# Patient Record
Sex: MALE | Race: OTHER | HISPANIC OR LATINO | URBAN - METROPOLITAN AREA
[De-identification: names, ages, dates, MRNs, and addresses within clinical notes are randomized per-mention and may not be internally consistent; named-entity substitution may affect disease eponyms.]

---

## 2024-02-21 ENCOUNTER — INPATIENT (INPATIENT)
Facility: HOSPITAL | Age: 24
LOS: 2 days | Discharge: HOME | DRG: 639 | End: 2024-02-24
Attending: STUDENT IN AN ORGANIZED HEALTH CARE EDUCATION/TRAINING PROGRAM | Admitting: INTERNAL MEDICINE
Payer: MEDICAID

## 2024-02-21 VITALS
TEMPERATURE: 98 F | SYSTOLIC BLOOD PRESSURE: 144 MMHG | WEIGHT: 123.02 LBS | RESPIRATION RATE: 18 BRPM | OXYGEN SATURATION: 98 % | DIASTOLIC BLOOD PRESSURE: 81 MMHG | HEART RATE: 68 BPM

## 2024-02-21 DIAGNOSIS — E11.10 TYPE 2 DIABETES MELLITUS WITH KETOACIDOSIS WITHOUT COMA: ICD-10-CM

## 2024-02-21 LAB
ALBUMIN SERPL ELPH-MCNC: 4.6 G/DL — SIGNIFICANT CHANGE UP (ref 3.3–5.2)
ALP SERPL-CCNC: 132 U/L — HIGH (ref 40–120)
ALT FLD-CCNC: 23 U/L — SIGNIFICANT CHANGE UP
ANION GAP SERPL CALC-SCNC: 25 MMOL/L — HIGH (ref 5–17)
APPEARANCE UR: CLEAR — SIGNIFICANT CHANGE UP
AST SERPL-CCNC: 16 U/L — SIGNIFICANT CHANGE UP
BACTERIA # UR AUTO: NEGATIVE /HPF — SIGNIFICANT CHANGE UP
BASE EXCESS BLDV CALC-SCNC: -7.5 MMOL/L — LOW (ref -2–3)
BASOPHILS # BLD AUTO: 0.03 K/UL — SIGNIFICANT CHANGE UP (ref 0–0.2)
BASOPHILS NFR BLD AUTO: 0.5 % — SIGNIFICANT CHANGE UP (ref 0–2)
BILIRUB SERPL-MCNC: 0.7 MG/DL — SIGNIFICANT CHANGE UP (ref 0.4–2)
BILIRUB UR-MCNC: NEGATIVE — SIGNIFICANT CHANGE UP
BUN SERPL-MCNC: 8.4 MG/DL — SIGNIFICANT CHANGE UP (ref 8–20)
CA-I SERPL-SCNC: 1.2 MMOL/L — SIGNIFICANT CHANGE UP (ref 1.15–1.33)
CALCIUM SERPL-MCNC: 8.9 MG/DL — SIGNIFICANT CHANGE UP (ref 8.4–10.5)
CAST: 1 /LPF — SIGNIFICANT CHANGE UP (ref 0–4)
CHLORIDE BLDV-SCNC: 95 MMOL/L — LOW (ref 96–108)
CHLORIDE SERPL-SCNC: 91 MMOL/L — LOW (ref 96–108)
CO2 SERPL-SCNC: 17 MMOL/L — LOW (ref 22–29)
COLOR SPEC: YELLOW — SIGNIFICANT CHANGE UP
CREAT SERPL-MCNC: 0.82 MG/DL — SIGNIFICANT CHANGE UP (ref 0.5–1.3)
DIFF PNL FLD: NEGATIVE — SIGNIFICANT CHANGE UP
EGFR: 127 ML/MIN/1.73M2 — SIGNIFICANT CHANGE UP
EOSINOPHIL # BLD AUTO: 0.06 K/UL — SIGNIFICANT CHANGE UP (ref 0–0.5)
EOSINOPHIL NFR BLD AUTO: 0.9 % — SIGNIFICANT CHANGE UP (ref 0–6)
GAS PNL BLDV: 133 MMOL/L — LOW (ref 136–145)
GAS PNL BLDV: SIGNIFICANT CHANGE UP
GAS PNL BLDV: SIGNIFICANT CHANGE UP
GLUCOSE BLDC GLUCOMTR-MCNC: 211 MG/DL — HIGH (ref 70–99)
GLUCOSE BLDV-MCNC: 403 MG/DL — HIGH (ref 70–99)
GLUCOSE SERPL-MCNC: 351 MG/DL — HIGH (ref 70–99)
GLUCOSE UR QL: >=1000 MG/DL
HCO3 BLDV-SCNC: 20 MMOL/L — LOW (ref 22–29)
HCT VFR BLD CALC: 45.2 % — SIGNIFICANT CHANGE UP (ref 39–50)
HCT VFR BLDA CALC: 49 % — SIGNIFICANT CHANGE UP
HGB BLD CALC-MCNC: 16.2 G/DL — SIGNIFICANT CHANGE UP (ref 12.6–17.4)
HGB BLD-MCNC: 15.9 G/DL — SIGNIFICANT CHANGE UP (ref 13–17)
IMM GRANULOCYTES NFR BLD AUTO: 0.2 % — SIGNIFICANT CHANGE UP (ref 0–0.9)
KETONES UR-MCNC: >=160 MG/DL
LACTATE BLDV-MCNC: 1.8 MMOL/L — SIGNIFICANT CHANGE UP (ref 0.5–2)
LEUKOCYTE ESTERASE UR-ACNC: NEGATIVE — SIGNIFICANT CHANGE UP
LYMPHOCYTES # BLD AUTO: 2.25 K/UL — SIGNIFICANT CHANGE UP (ref 1–3.3)
LYMPHOCYTES # BLD AUTO: 34.8 % — SIGNIFICANT CHANGE UP (ref 13–44)
MCHC RBC-ENTMCNC: 29 PG — SIGNIFICANT CHANGE UP (ref 27–34)
MCHC RBC-ENTMCNC: 35.2 GM/DL — SIGNIFICANT CHANGE UP (ref 32–36)
MCV RBC AUTO: 82.5 FL — SIGNIFICANT CHANGE UP (ref 80–100)
MONOCYTES # BLD AUTO: 0.49 K/UL — SIGNIFICANT CHANGE UP (ref 0–0.9)
MONOCYTES NFR BLD AUTO: 7.6 % — SIGNIFICANT CHANGE UP (ref 2–14)
NEUTROPHILS # BLD AUTO: 3.62 K/UL — SIGNIFICANT CHANGE UP (ref 1.8–7.4)
NEUTROPHILS NFR BLD AUTO: 56 % — SIGNIFICANT CHANGE UP (ref 43–77)
NITRITE UR-MCNC: NEGATIVE — SIGNIFICANT CHANGE UP
PCO2 BLDV: 45 MMHG — SIGNIFICANT CHANGE UP (ref 42–55)
PH BLDV: 7.25 — LOW (ref 7.32–7.43)
PH UR: 5.5 — SIGNIFICANT CHANGE UP (ref 5–8)
PLATELET # BLD AUTO: 337 K/UL — SIGNIFICANT CHANGE UP (ref 150–400)
PO2 BLDV: 47 MMHG — HIGH (ref 25–45)
POTASSIUM BLDV-SCNC: 4.1 MMOL/L — SIGNIFICANT CHANGE UP (ref 3.5–5.1)
POTASSIUM SERPL-MCNC: 4 MMOL/L — SIGNIFICANT CHANGE UP (ref 3.5–5.3)
POTASSIUM SERPL-SCNC: 4 MMOL/L — SIGNIFICANT CHANGE UP (ref 3.5–5.3)
PROT SERPL-MCNC: 8 G/DL — SIGNIFICANT CHANGE UP (ref 6.6–8.7)
PROT UR-MCNC: 30 MG/DL
RBC # BLD: 5.48 M/UL — SIGNIFICANT CHANGE UP (ref 4.2–5.8)
RBC # FLD: 12.1 % — SIGNIFICANT CHANGE UP (ref 10.3–14.5)
RBC CASTS # UR COMP ASSIST: 1 /HPF — SIGNIFICANT CHANGE UP (ref 0–4)
SAO2 % BLDV: 75.4 % — SIGNIFICANT CHANGE UP
SODIUM SERPL-SCNC: 133 MMOL/L — LOW (ref 135–145)
SP GR SPEC: >1.03 — HIGH (ref 1–1.03)
SQUAMOUS # UR AUTO: 1 /HPF — SIGNIFICANT CHANGE UP (ref 0–5)
UROBILINOGEN FLD QL: 1 MG/DL — SIGNIFICANT CHANGE UP (ref 0.2–1)
WBC # BLD: 6.46 K/UL — SIGNIFICANT CHANGE UP (ref 3.8–10.5)
WBC # FLD AUTO: 6.46 K/UL — SIGNIFICANT CHANGE UP (ref 3.8–10.5)
WBC UR QL: 0 /HPF — SIGNIFICANT CHANGE UP (ref 0–5)

## 2024-02-21 PROCEDURE — 74177 CT ABD & PELVIS W/CONTRAST: CPT | Mod: 26,MA

## 2024-02-21 PROCEDURE — 99285 EMERGENCY DEPT VISIT HI MDM: CPT

## 2024-02-21 RX ORDER — INSULIN HUMAN 100 [IU]/ML
6 INJECTION, SOLUTION SUBCUTANEOUS
Qty: 100 | Refills: 0 | Status: DISCONTINUED | OUTPATIENT
Start: 2024-02-21 | End: 2024-02-22

## 2024-02-21 RX ORDER — SODIUM CHLORIDE 9 MG/ML
1000 INJECTION, SOLUTION INTRAVENOUS ONCE
Refills: 0 | Status: COMPLETED | OUTPATIENT
Start: 2024-02-21 | End: 2024-02-21

## 2024-02-21 RX ORDER — CHLORHEXIDINE GLUCONATE 213 G/1000ML
1 SOLUTION TOPICAL
Refills: 0 | Status: DISCONTINUED | OUTPATIENT
Start: 2024-02-21 | End: 2024-02-24

## 2024-02-21 RX ORDER — SODIUM CHLORIDE 9 MG/ML
1000 INJECTION, SOLUTION INTRAVENOUS
Refills: 0 | Status: DISCONTINUED | OUTPATIENT
Start: 2024-02-21 | End: 2024-02-22

## 2024-02-21 RX ORDER — KETOROLAC TROMETHAMINE 30 MG/ML
15 SYRINGE (ML) INJECTION ONCE
Refills: 0 | Status: DISCONTINUED | OUTPATIENT
Start: 2024-02-21 | End: 2024-02-21

## 2024-02-21 RX ADMIN — SODIUM CHLORIDE 1000 MILLILITER(S): 9 INJECTION, SOLUTION INTRAVENOUS at 19:42

## 2024-02-21 RX ADMIN — SODIUM CHLORIDE 1000 MILLILITER(S): 9 INJECTION, SOLUTION INTRAVENOUS at 20:41

## 2024-02-21 RX ADMIN — SODIUM CHLORIDE 250 MILLILITER(S): 9 INJECTION, SOLUTION INTRAVENOUS at 23:15

## 2024-02-21 RX ADMIN — SODIUM CHLORIDE 2000 MILLILITER(S): 9 INJECTION, SOLUTION INTRAVENOUS at 22:04

## 2024-02-21 RX ADMIN — INSULIN HUMAN 6 UNIT(S)/HR: 100 INJECTION, SOLUTION SUBCUTANEOUS at 22:05

## 2024-02-21 RX ADMIN — Medication 15 MILLIGRAM(S): at 20:13

## 2024-02-21 RX ADMIN — Medication 15 MILLIGRAM(S): at 19:43

## 2024-02-21 NOTE — PATIENT PROFILE ADULT - FALL HARM RISK - UNIVERSAL INTERVENTIONS
Bed in lowest position, wheels locked, appropriate side rails in place/Call bell, personal items and telephone in reach/Instruct patient to call for assistance before getting out of bed or chair/Non-slip footwear when patient is out of bed/[place] to call system/Physically safe environment - no spills, clutter or unnecessary equipment/Purposeful Proactive Rounding/Room/bathroom lighting operational, light cord in reach

## 2024-02-21 NOTE — ED ADULT TRIAGE NOTE - SOURCE OF INFORMATION
Patient
[FreeTextEntry1] : Generally well-developed male in no acute distress.  Lungs: Clear.  Heart: Regular rate and rhythm, normal S1 and S2.  Abdomen: Positive bowel sounds, soft nontender.  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  Right below the knee distal limb with skin intact, bulbous.  Left below-knee distal limb with hyperpigmentation at the distal aspect of the tibia and a hyperpigmented callus just proximal and lateral to the distal tibia.  There is no drainage from the callus.  Range of motion of both lower extremities examined within the wheelchair are within functional limits.  Motor strength is about 5/5 throughout upper and lower extremities.  The left prosthesis is very worn, especially the outer covering.  The socket liner has an area of color change which approximately corresponds where the callus is.  Patient only had one prosthesis, so I did not attempt to have him stand.  Also he had to leave because his aide was staying past her quitting time

## 2024-02-21 NOTE — ED ADULT NURSE REASSESSMENT NOTE - NS ED NURSE REASSESS COMMENT FT1
Assumed care of pt in CC from ANTON Mann: pt found to be new onset diabetic. Education provided at bedside by MICU MD and PA. Pt and family memember display understanding. PT. A&Ox4 and amb. NSR on CM Resp. equal and unlabored b/l. VSS. NAD> Report given to floor ANTON Santacruz. [facility]'s on [facility].

## 2024-02-21 NOTE — ED ADULT NURSE NOTE - PRO INTERPRETER NEED 2
Birth Control Pills Counseling: Birth Control Pill Counseling: I discussed with the patient the potential side effects of OCPs including but not limited to increased risk of stroke, heart attack, thrombophlebitis, deep venous thrombosis, hepatic adenomas, breast changes, GI upset, headaches, and depression.  The patient verbalized understanding of the proper use and possible adverse effects of OCPs. All of the patient's questions and concerns were addressed. English Tetracycline Counseling: Patient counseled regarding possible photosensitivity and increased risk for sunburn.  Patient instructed to avoid sunlight, if possible.  When exposed to sunlight, patients should wear protective clothing, sunglasses, and sunscreen.  The patient was instructed to call the office immediately if the following severe adverse effects occur:  hearing changes, easy bruising/bleeding, severe headache, or vision changes.  The patient verbalized understanding of the proper use and possible adverse effects of tetracycline.  All of the patient's questions and concerns were addressed. Patient understands to avoid pregnancy while on therapy due to potential birth defects. Azithromycin Counseling:  I discussed with the patient the risks of azithromycin including but not limited to GI upset, allergic reaction, drug rash, diarrhea, and yeast infections. Topical Sulfur Applications Counseling: Topical Sulfur Counseling: Patient counseled that this medication may cause skin irritation or allergic reactions.  In the event of skin irritation, the patient was advised to reduce the amount of the drug applied or use it less frequently.   The patient verbalized understanding of the proper use and possible adverse effects of topical sulfur application.  All of the patient's questions and concerns were addressed. Tazorac Pregnancy And Lactation Text: This medication is not safe during pregnancy. It is unknown if this medication is excreted in breast milk. Doxycycline Counseling:  Patient counseled regarding possible photosensitivity and increased risk for sunburn.  Patient instructed to avoid sunlight, if possible.  When exposed to sunlight, patients should wear protective clothing, sunglasses, and sunscreen.  The patient was instructed to call the office immediately if the following severe adverse effects occur:  hearing changes, easy bruising/bleeding, severe headache, or vision changes.  The patient verbalized understanding of the proper use and possible adverse effects of doxycycline.  All of the patient's questions and concerns were addressed. Topical Clindamycin Counseling: Patient counseled that this medication may cause skin irritation or allergic reactions.  In the event of skin irritation, the patient was advised to reduce the amount of the drug applied or use it less frequently.   The patient verbalized understanding of the proper use and possible adverse effects of clindamycin.  All of the patient's questions and concerns were addressed. Topical Clindamycin Pregnancy And Lactation Text: This medication is Pregnancy Category B and is considered safe during pregnancy. It is unknown if it is excreted in breast milk. Erythromycin Pregnancy And Lactation Text: This medication is Pregnancy Category B and is considered safe during pregnancy. It is also excreted in breast milk. High Dose Vitamin A Pregnancy And Lactation Text: High dose vitamin A therapy is contraindicated during pregnancy and breast feeding. Doxycycline Pregnancy And Lactation Text: This medication is Pregnancy Category D and not consider safe during pregnancy. It is also excreted in breast milk but is considered safe for shorter treatment courses. Topical Retinoid Pregnancy And Lactation Text: This medication is Pregnancy Category C. It is unknown if this medication is excreted in breast milk. Topical Retinoid counseling:  Patient advised to apply a pea-sized amount only at bedtime and wait 30 minutes after washing their face before applying.  If too drying, patient may add a non-comedogenic moisturizer. The patient verbalized understanding of the proper use and possible adverse effects of retinoids.  All of the patient's questions and concerns were addressed. Erythromycin Counseling:  I discussed with the patient the risks of erythromycin including but not limited to GI upset, allergic reaction, drug rash, diarrhea, increase in liver enzymes, and yeast infections. Minocycline Counseling: Patient advised regarding possible photosensitivity and discoloration of the teeth, skin, lips, tongue and gums.  Patient instructed to avoid sunlight, if possible.  When exposed to sunlight, patients should wear protective clothing, sunglasses, and sunscreen.  The patient was instructed to call the office immediately if the following severe adverse effects occur:  hearing changes, easy bruising/bleeding, severe headache, or vision changes.  The patient verbalized understanding of the proper use and possible adverse effects of minocycline.  All of the patient's questions and concerns were addressed. Birth Control Pills Pregnancy And Lactation Text: This medication should be avoided if pregnant and for the first 30 days post-partum. Azithromycin Pregnancy And Lactation Text: This medication is considered safe during pregnancy and is also secreted in breast milk. Dapsone Counseling: I discussed with the patient the risks of dapsone including but not limited to hemolytic anemia, agranulocytosis, rashes, methemoglobinemia, kidney failure, peripheral neuropathy, headaches, GI upset, and liver toxicity.  Patients who start dapsone require monitoring including baseline LFTs and weekly CBCs for the first month, then every month thereafter.  The patient verbalized understanding of the proper use and possible adverse effects of dapsone.  All of the patient's questions and concerns were addressed. Include Pregnancy/Lactation Warning?: No Benzoyl Peroxide Pregnancy And Lactation Text: This medication is Pregnancy Category C. It is unknown if benzoyl peroxide is excreted in breast milk. Detail Level: Detailed Bactrim Pregnancy And Lactation Text: This medication is Pregnancy Category D and is known to cause fetal risk.  It is also excreted in breast milk. Isotretinoin Counseling: Patient should get monthly blood tests, not donate blood, not drive at night if vision affected, not share medication, and not undergo elective surgery for 6 months after tx completed. Side effects reviewed, pt to contact office should one occur. Benzoyl Peroxide Counseling: Patient counseled that medicine may cause skin irritation and bleach clothing.  In the event of skin irritation, the patient was advised to reduce the amount of the drug applied or use it less frequently.   The patient verbalized understanding of the proper use and possible adverse effects of benzoyl peroxide.  All of the patient's questions and concerns were addressed. Spironolactone Counseling: Patient advised regarding risks of diarrhea, abdominal pain, hyperkalemia, birth defects (for female patients), liver toxicity and renal toxicity. The patient may need blood work to monitor liver and kidney function and potassium levels while on therapy. The patient verbalized understanding of the proper use and possible adverse effects of spironolactone.  All of the patient's questions and concerns were addressed. Topical Sulfur Applications Pregnancy And Lactation Text: This medication is Pregnancy Category C and has an unknown safety profile during pregnancy. It is unknown if this topical medication is excreted in breast milk. Tazorac Counseling:  Patient advised that medication is irritating and drying.  Patient may need to apply sparingly and wash off after an hour before eventually leaving it on overnight.  The patient verbalized understanding of the proper use and possible adverse effects of tazorac.  All of the patient's questions and concerns were addressed. Bactrim Counseling:  I discussed with the patient the risks of sulfa antibiotics including but not limited to GI upset, allergic reaction, drug rash, diarrhea, dizziness, photosensitivity, and yeast infections.  Rarely, more serious reactions can occur including but not limited to aplastic anemia, agranulocytosis, methemoglobinemia, blood dyscrasias, liver or kidney failure, lung infiltrates or desquamative/blistering drug rashes. Spironolactone Pregnancy And Lactation Text: This medication can cause feminization of the male fetus and should be avoided during pregnancy. The active metabolite is also found in breast milk. Isotretinoin Pregnancy And Lactation Text: This medication is Pregnancy Category X and is considered extremely dangerous during pregnancy. It is unknown if it is excreted in breast milk. Dapsone Pregnancy And Lactation Text: This medication is Pregnancy Category C and is not considered safe during pregnancy or breast feeding. Tetracycline Pregnancy And Lactation Text: This medication is Pregnancy Category D and not consider safe during pregnancy. It is also excreted in breast milk. High Dose Vitamin A Counseling: Side effects reviewed, pt to contact office should one occur.

## 2024-02-21 NOTE — H&P ADULT - NEGATIVE ENMT SYMPTOMS
no hearing difficulty/no ear pain/no nasal congestion/no nasal discharge/no dry mouth/no throat pain

## 2024-02-21 NOTE — ED PROVIDER NOTE - PHYSICAL EXAMINATION
Const: Awake, alert and oriented. In no acute distress. Well appearing.  HEENT: NC/AT. Moist mucous membranes.  Eyes: No scleral icterus. EOMI.  Neck:. Soft and supple. Full ROM without pain.  Cardiac: Regular rate and regular rhythm. +S1/S2. Peripheral pulses 2+ and symmetric. No LE edema.  Resp: Speaking in full sentences. No evidence of respiratory distress. No wheezes, rales or rhonchi.  Abd: Soft, ttp rlq, non-distended. Normal bowel sounds in all 4 quadrants. No guarding or rebound.  Back: Spine midline and non-tender. No CVAT.  Skin: No rashes, abrasions or lacerations.  Lymph: No cervical lymphadenopathy.  Neuro: Awake, alert & oriented x 3. Moves all extremities symmetrically.

## 2024-02-21 NOTE — ED PROVIDER NOTE - CLINICAL SUMMARY MEDICAL DECISION MAKING FREE TEXT BOX
24yo male with no pmh presents with abd pain, constipation, polyuria, polydipsia and weight loss - pt hyperglycemic here concern for DKA also ttp rlq r/o appendicitis - labs, ctap 24yo male with no pmh presents with abd pain, constipation, polyuria, polydipsia and weight loss - pt hyperglycemic here concern for DKA also ttp rlq r/o appendicitis - labs, ctap    Dorie Sullivan PA : PT evaluated by intake physician. HPI/PE/ROS as noted above. Will follow up plan per intake physician  pt found to be in DKA, moved to critical and started on insulin infusion, pt and father updated w  janette. seen by micu pa admitting pt for further mgmt.

## 2024-02-21 NOTE — H&P ADULT - CRITICAL CARE ATTENDING COMMENT
Pt seen w PA and agree w above.  Pt reported not feeling well, + nausea no vomiting, polydipsia and polyuria. + weight loss. Denies FamHx of DM.  Pt found to be in DKA.  Treated w insulin drip and IVF.  Monitor FSBS Q1hr w serial metabolic panels.  Endocrine eval. Start lantus when AG closed. Monitor in ICU.

## 2024-02-21 NOTE — ED PROVIDER NOTE - OBJECTIVE STATEMENT
22yo male with no pmh presents with abd pain, constipation, polyuria, polydipsia and weight loss. Pt states for the past 3 days with constipation and past 2 weeks with the other symptoms. Pt states he's lost 10lbs in 2 weeks. Pt denies fevers/chills, ha, loc, focal neuro deficits, cp/sob/palp, cough, n/v/d, hematuria, dysuria, recent travel and sick contacts.

## 2024-02-21 NOTE — H&P ADULT - ASSESSMENT
Patient is a 22 y/o male with no pmhx. Comes into the ED complaining of abdominal pain. Patient was found to have:    #DKA w/ elevated anion gap (initial of 25)  #metabolic acidosis  #hypochloremia  #pseudohyponatremia    -Started on insulin drip at 6  -2L LR bolus given in ED  -started d5LR drip @250  -q1hr glucose monitoring  -monitor i/o's  -monitor electrolytes  -transition to sliding scale and subq insulin once bg<180 and gap closed  -no previous chart encounters found to review due to negative pmhx   -all labs and imaging reviewed. abd ct showed no acute pathology    Admit to MICU    Date of entry of this note is consistent with date of services rendered. Total time spent at bedside caring for patient: 60 minutes

## 2024-02-21 NOTE — ED ADULT NURSE NOTE - OBJECTIVE STATEMENT
Pt presents to the ED A&Ox4 c/o abdominal pain and increased urination and thirst for the last few weeks. Pt denies chest/abdominal pain. RR even and unlabored, NAD noted. Pt abdomen is soft,, NT/ND upon palpation. Pt denies any PMH. Pt presents to the ED A&Ox4 c/o abdominal pain and increased urination and thirst for the last few weeks. Pt reports difficulty having a BM for the last 3 days. Pt denies chest/abdominal pain. RR even and unlabored, NAD noted. Pt abdomen is soft,, NT/ND upon palpation. Pt denies any PMH.

## 2024-02-21 NOTE — H&P ADULT - HISTORY OF PRESENT ILLNESS
Patient is a 22 y/o male with no pmhx. Comes into the ED complaining of abdominal pain. He also mentioned recent increase in urinary frequency, feeling thirsty all the time, constipation, and new weight loss of about 10 lbs. Symptoms have been ongoing for a few weeks now. Patient denied any recent illness, denies fever, n/v/d, or recent travel. Patient also mentioned he was not aware of any family members who have diabetes type 1 or 2. While in the ED, patient's BG was found to be 360, anion gap of 25, ph of 7.25. Patient was determined to be in DKA and MICU was consulted for further management.

## 2024-02-21 NOTE — ED PROVIDER NOTE - NS ED ROS FT
+ abd pain, constipation, polyuria, polydipsia and weight loss  all others negative except as per hpi

## 2024-02-22 LAB
A1C WITH ESTIMATED AVERAGE GLUCOSE RESULT: 11.8 % — HIGH (ref 4–5.6)
ALBUMIN SERPL ELPH-MCNC: 3.3 G/DL — SIGNIFICANT CHANGE UP (ref 3.3–5.2)
ALBUMIN SERPL ELPH-MCNC: 3.5 G/DL — SIGNIFICANT CHANGE UP (ref 3.3–5.2)
ALBUMIN SERPL ELPH-MCNC: 3.6 G/DL — SIGNIFICANT CHANGE UP (ref 3.3–5.2)
ALBUMIN SERPL ELPH-MCNC: 3.6 G/DL — SIGNIFICANT CHANGE UP (ref 3.3–5.2)
ALP SERPL-CCNC: 87 U/L — SIGNIFICANT CHANGE UP (ref 40–120)
ALP SERPL-CCNC: 91 U/L — SIGNIFICANT CHANGE UP (ref 40–120)
ALP SERPL-CCNC: 96 U/L — SIGNIFICANT CHANGE UP (ref 40–120)
ALP SERPL-CCNC: 97 U/L — SIGNIFICANT CHANGE UP (ref 40–120)
ALT FLD-CCNC: 14 U/L — SIGNIFICANT CHANGE UP
ALT FLD-CCNC: 16 U/L — SIGNIFICANT CHANGE UP
ALT FLD-CCNC: 17 U/L — SIGNIFICANT CHANGE UP
ALT FLD-CCNC: 17 U/L — SIGNIFICANT CHANGE UP
ANION GAP SERPL CALC-SCNC: 15 MMOL/L — SIGNIFICANT CHANGE UP (ref 5–17)
ANION GAP SERPL CALC-SCNC: 17 MMOL/L — SIGNIFICANT CHANGE UP (ref 5–17)
ANION GAP SERPL CALC-SCNC: 20 MMOL/L — HIGH (ref 5–17)
ANION GAP SERPL CALC-SCNC: 8 MMOL/L — SIGNIFICANT CHANGE UP (ref 5–17)
AST SERPL-CCNC: 11 U/L — SIGNIFICANT CHANGE UP
AST SERPL-CCNC: 15 U/L — SIGNIFICANT CHANGE UP
AST SERPL-CCNC: 16 U/L — SIGNIFICANT CHANGE UP
AST SERPL-CCNC: 18 U/L — SIGNIFICANT CHANGE UP
BASOPHILS # BLD AUTO: 0.03 K/UL — SIGNIFICANT CHANGE UP (ref 0–0.2)
BASOPHILS NFR BLD AUTO: 0.6 % — SIGNIFICANT CHANGE UP (ref 0–2)
BILIRUB SERPL-MCNC: 0.6 MG/DL — SIGNIFICANT CHANGE UP (ref 0.4–2)
BILIRUB SERPL-MCNC: 0.7 MG/DL — SIGNIFICANT CHANGE UP (ref 0.4–2)
BUN SERPL-MCNC: 5.4 MG/DL — LOW (ref 8–20)
BUN SERPL-MCNC: 6.2 MG/DL — LOW (ref 8–20)
BUN SERPL-MCNC: 6.6 MG/DL — LOW (ref 8–20)
BUN SERPL-MCNC: 7.3 MG/DL — LOW (ref 8–20)
C PEPTIDE SERPL-MCNC: 0.6 NG/ML — LOW (ref 1.1–4.4)
CALCIUM SERPL-MCNC: 7.9 MG/DL — LOW (ref 8.4–10.5)
CALCIUM SERPL-MCNC: 8 MG/DL — LOW (ref 8.4–10.5)
CALCIUM SERPL-MCNC: 8.1 MG/DL — LOW (ref 8.4–10.5)
CALCIUM SERPL-MCNC: 8.1 MG/DL — LOW (ref 8.4–10.5)
CHLORIDE SERPL-SCNC: 104 MMOL/L — SIGNIFICANT CHANGE UP (ref 96–108)
CHLORIDE SERPL-SCNC: 95 MMOL/L — LOW (ref 96–108)
CHLORIDE SERPL-SCNC: 95 MMOL/L — LOW (ref 96–108)
CHLORIDE SERPL-SCNC: 97 MMOL/L — SIGNIFICANT CHANGE UP (ref 96–108)
CO2 SERPL-SCNC: 17 MMOL/L — LOW (ref 22–29)
CO2 SERPL-SCNC: 17 MMOL/L — LOW (ref 22–29)
CO2 SERPL-SCNC: 22 MMOL/L — SIGNIFICANT CHANGE UP (ref 22–29)
CO2 SERPL-SCNC: 26 MMOL/L — SIGNIFICANT CHANGE UP (ref 22–29)
CREAT SERPL-MCNC: 0.65 MG/DL — SIGNIFICANT CHANGE UP (ref 0.5–1.3)
CREAT SERPL-MCNC: 0.66 MG/DL — SIGNIFICANT CHANGE UP (ref 0.5–1.3)
CREAT SERPL-MCNC: 0.68 MG/DL — SIGNIFICANT CHANGE UP (ref 0.5–1.3)
CREAT SERPL-MCNC: 0.71 MG/DL — SIGNIFICANT CHANGE UP (ref 0.5–1.3)
CULTURE RESULTS: SIGNIFICANT CHANGE UP
EGFR: 132 ML/MIN/1.73M2 — SIGNIFICANT CHANGE UP
EGFR: 134 ML/MIN/1.73M2 — SIGNIFICANT CHANGE UP
EGFR: 135 ML/MIN/1.73M2 — SIGNIFICANT CHANGE UP
EGFR: 136 ML/MIN/1.73M2 — SIGNIFICANT CHANGE UP
EOSINOPHIL # BLD AUTO: 0.14 K/UL — SIGNIFICANT CHANGE UP (ref 0–0.5)
EOSINOPHIL NFR BLD AUTO: 3 % — SIGNIFICANT CHANGE UP (ref 0–6)
ESTIMATED AVERAGE GLUCOSE: 292 MG/DL — HIGH (ref 68–114)
GLUCOSE BLDC GLUCOMTR-MCNC: 103 MG/DL — HIGH (ref 70–99)
GLUCOSE BLDC GLUCOMTR-MCNC: 122 MG/DL — HIGH (ref 70–99)
GLUCOSE BLDC GLUCOMTR-MCNC: 145 MG/DL — HIGH (ref 70–99)
GLUCOSE BLDC GLUCOMTR-MCNC: 156 MG/DL — HIGH (ref 70–99)
GLUCOSE BLDC GLUCOMTR-MCNC: 160 MG/DL — HIGH (ref 70–99)
GLUCOSE BLDC GLUCOMTR-MCNC: 160 MG/DL — HIGH (ref 70–99)
GLUCOSE BLDC GLUCOMTR-MCNC: 195 MG/DL — HIGH (ref 70–99)
GLUCOSE BLDC GLUCOMTR-MCNC: 248 MG/DL — HIGH (ref 70–99)
GLUCOSE BLDC GLUCOMTR-MCNC: 257 MG/DL — HIGH (ref 70–99)
GLUCOSE BLDC GLUCOMTR-MCNC: 261 MG/DL — HIGH (ref 70–99)
GLUCOSE BLDC GLUCOMTR-MCNC: 292 MG/DL — HIGH (ref 70–99)
GLUCOSE BLDC GLUCOMTR-MCNC: 301 MG/DL — HIGH (ref 70–99)
GLUCOSE BLDC GLUCOMTR-MCNC: 83 MG/DL — SIGNIFICANT CHANGE UP (ref 70–99)
GLUCOSE SERPL-MCNC: 248 MG/DL — HIGH (ref 70–99)
GLUCOSE SERPL-MCNC: 279 MG/DL — HIGH (ref 70–99)
GLUCOSE SERPL-MCNC: 291 MG/DL — HIGH (ref 70–99)
GLUCOSE SERPL-MCNC: 76 MG/DL — SIGNIFICANT CHANGE UP (ref 70–99)
HCT VFR BLD CALC: 34 % — LOW (ref 39–50)
HGB BLD-MCNC: 12.4 G/DL — LOW (ref 13–17)
IMM GRANULOCYTES NFR BLD AUTO: 0.4 % — SIGNIFICANT CHANGE UP (ref 0–0.9)
LYMPHOCYTES # BLD AUTO: 2.26 K/UL — SIGNIFICANT CHANGE UP (ref 1–3.3)
LYMPHOCYTES # BLD AUTO: 47.7 % — HIGH (ref 13–44)
MAGNESIUM SERPL-MCNC: 1.5 MG/DL — LOW (ref 1.6–2.6)
MCHC RBC-ENTMCNC: 29.9 PG — SIGNIFICANT CHANGE UP (ref 27–34)
MCHC RBC-ENTMCNC: 36.5 GM/DL — HIGH (ref 32–36)
MCV RBC AUTO: 81.9 FL — SIGNIFICANT CHANGE UP (ref 80–100)
MONOCYTES # BLD AUTO: 0.57 K/UL — SIGNIFICANT CHANGE UP (ref 0–0.9)
MONOCYTES NFR BLD AUTO: 12 % — SIGNIFICANT CHANGE UP (ref 2–14)
MRSA PCR RESULT.: SIGNIFICANT CHANGE UP
NEUTROPHILS # BLD AUTO: 1.72 K/UL — LOW (ref 1.8–7.4)
NEUTROPHILS NFR BLD AUTO: 36.3 % — LOW (ref 43–77)
PHOSPHATE SERPL-MCNC: 3.1 MG/DL — SIGNIFICANT CHANGE UP (ref 2.4–4.7)
PLATELET # BLD AUTO: 202 K/UL — SIGNIFICANT CHANGE UP (ref 150–400)
POTASSIUM SERPL-MCNC: 2.9 MMOL/L — CRITICAL LOW (ref 3.5–5.3)
POTASSIUM SERPL-MCNC: 3.7 MMOL/L — SIGNIFICANT CHANGE UP (ref 3.5–5.3)
POTASSIUM SERPL-MCNC: 4.1 MMOL/L — SIGNIFICANT CHANGE UP (ref 3.5–5.3)
POTASSIUM SERPL-MCNC: 5 MMOL/L — SIGNIFICANT CHANGE UP (ref 3.5–5.3)
POTASSIUM SERPL-SCNC: 2.9 MMOL/L — CRITICAL LOW (ref 3.5–5.3)
POTASSIUM SERPL-SCNC: 3.7 MMOL/L — SIGNIFICANT CHANGE UP (ref 3.5–5.3)
POTASSIUM SERPL-SCNC: 4.1 MMOL/L — SIGNIFICANT CHANGE UP (ref 3.5–5.3)
POTASSIUM SERPL-SCNC: 5 MMOL/L — SIGNIFICANT CHANGE UP (ref 3.5–5.3)
PROT SERPL-MCNC: 5.3 G/DL — LOW (ref 6.6–8.7)
PROT SERPL-MCNC: 5.7 G/DL — LOW (ref 6.6–8.7)
PROT SERPL-MCNC: 5.8 G/DL — LOW (ref 6.6–8.7)
PROT SERPL-MCNC: 6 G/DL — LOW (ref 6.6–8.7)
RAPID RVP RESULT: SIGNIFICANT CHANGE UP
RBC # BLD: 4.15 M/UL — LOW (ref 4.2–5.8)
RBC # FLD: 12 % — SIGNIFICANT CHANGE UP (ref 10.3–14.5)
S AUREUS DNA NOSE QL NAA+PROBE: DETECTED
SARS-COV-2 RNA SPEC QL NAA+PROBE: SIGNIFICANT CHANGE UP
SODIUM SERPL-SCNC: 129 MMOL/L — LOW (ref 135–145)
SODIUM SERPL-SCNC: 132 MMOL/L — LOW (ref 135–145)
SODIUM SERPL-SCNC: 134 MMOL/L — LOW (ref 135–145)
SODIUM SERPL-SCNC: 138 MMOL/L — SIGNIFICANT CHANGE UP (ref 135–145)
SPECIMEN SOURCE: SIGNIFICANT CHANGE UP
WBC # BLD: 4.74 K/UL — SIGNIFICANT CHANGE UP (ref 3.8–10.5)
WBC # FLD AUTO: 4.74 K/UL — SIGNIFICANT CHANGE UP (ref 3.8–10.5)

## 2024-02-22 PROCEDURE — 99232 SBSQ HOSP IP/OBS MODERATE 35: CPT | Mod: GC

## 2024-02-22 PROCEDURE — 99223 1ST HOSP IP/OBS HIGH 75: CPT

## 2024-02-22 RX ORDER — DEXTROSE 50 % IN WATER 50 %
25 SYRINGE (ML) INTRAVENOUS ONCE
Refills: 0 | Status: DISCONTINUED | OUTPATIENT
Start: 2024-02-22 | End: 2024-02-24

## 2024-02-22 RX ORDER — ACETAMINOPHEN 500 MG
650 TABLET ORAL EVERY 6 HOURS
Refills: 0 | Status: DISCONTINUED | OUTPATIENT
Start: 2024-02-22 | End: 2024-02-24

## 2024-02-22 RX ORDER — SODIUM CHLORIDE 9 MG/ML
1000 INJECTION, SOLUTION INTRAVENOUS
Refills: 0 | Status: DISCONTINUED | OUTPATIENT
Start: 2024-02-22 | End: 2024-02-22

## 2024-02-22 RX ORDER — INSULIN HUMAN 100 [IU]/ML
4 INJECTION, SOLUTION SUBCUTANEOUS
Qty: 100 | Refills: 0 | Status: DISCONTINUED | OUTPATIENT
Start: 2024-02-22 | End: 2024-02-22

## 2024-02-22 RX ORDER — POTASSIUM CHLORIDE 20 MEQ
40 PACKET (EA) ORAL EVERY 4 HOURS
Refills: 0 | Status: COMPLETED | OUTPATIENT
Start: 2024-02-22 | End: 2024-02-22

## 2024-02-22 RX ORDER — INSULIN GLARGINE 100 [IU]/ML
6 INJECTION, SOLUTION SUBCUTANEOUS EVERY MORNING
Refills: 0 | Status: DISCONTINUED | OUTPATIENT
Start: 2024-02-22 | End: 2024-02-22

## 2024-02-22 RX ORDER — ENOXAPARIN SODIUM 100 MG/ML
40 INJECTION SUBCUTANEOUS EVERY 24 HOURS
Refills: 0 | Status: DISCONTINUED | OUTPATIENT
Start: 2024-02-22 | End: 2024-02-24

## 2024-02-22 RX ORDER — GLUCAGON INJECTION, SOLUTION 0.5 MG/.1ML
1 INJECTION, SOLUTION SUBCUTANEOUS ONCE
Refills: 0 | Status: DISCONTINUED | OUTPATIENT
Start: 2024-02-22 | End: 2024-02-22

## 2024-02-22 RX ORDER — DEXTROSE 50 % IN WATER 50 %
15 SYRINGE (ML) INTRAVENOUS ONCE
Refills: 0 | Status: DISCONTINUED | OUTPATIENT
Start: 2024-02-22 | End: 2024-02-22

## 2024-02-22 RX ORDER — SODIUM CHLORIDE 9 MG/ML
1000 INJECTION, SOLUTION INTRAVENOUS
Refills: 0 | Status: DISCONTINUED | OUTPATIENT
Start: 2024-02-22 | End: 2024-02-23

## 2024-02-22 RX ORDER — POTASSIUM CHLORIDE 20 MEQ
10 PACKET (EA) ORAL
Refills: 0 | Status: COMPLETED | OUTPATIENT
Start: 2024-02-22 | End: 2024-02-22

## 2024-02-22 RX ORDER — DEXTROSE 50 % IN WATER 50 %
12.5 SYRINGE (ML) INTRAVENOUS ONCE
Refills: 0 | Status: DISCONTINUED | OUTPATIENT
Start: 2024-02-22 | End: 2024-02-24

## 2024-02-22 RX ORDER — INSULIN GLARGINE 100 [IU]/ML
10 INJECTION, SOLUTION SUBCUTANEOUS EVERY MORNING
Refills: 0 | Status: DISCONTINUED | OUTPATIENT
Start: 2024-02-23 | End: 2024-02-23

## 2024-02-22 RX ORDER — INSULIN LISPRO 100/ML
3 VIAL (ML) SUBCUTANEOUS
Refills: 0 | Status: DISCONTINUED | OUTPATIENT
Start: 2024-02-22 | End: 2024-02-22

## 2024-02-22 RX ORDER — MAGNESIUM SULFATE 500 MG/ML
2 VIAL (ML) INJECTION ONCE
Refills: 0 | Status: COMPLETED | OUTPATIENT
Start: 2024-02-22 | End: 2024-02-22

## 2024-02-22 RX ORDER — INFLUENZA VIRUS VACCINE 15; 15; 15; 15 UG/.5ML; UG/.5ML; UG/.5ML; UG/.5ML
0.5 SUSPENSION INTRAMUSCULAR ONCE
Refills: 0 | Status: COMPLETED | OUTPATIENT
Start: 2024-02-22 | End: 2024-02-24

## 2024-02-22 RX ORDER — INSULIN LISPRO 100/ML
VIAL (ML) SUBCUTANEOUS
Refills: 0 | Status: DISCONTINUED | OUTPATIENT
Start: 2024-02-22 | End: 2024-02-22

## 2024-02-22 RX ORDER — INSULIN HUMAN 100 [IU]/ML
2 INJECTION, SOLUTION SUBCUTANEOUS
Qty: 100 | Refills: 0 | Status: DISCONTINUED | OUTPATIENT
Start: 2024-02-22 | End: 2024-02-23

## 2024-02-22 RX ORDER — INSULIN GLARGINE 100 [IU]/ML
4 INJECTION, SOLUTION SUBCUTANEOUS ONCE
Refills: 0 | Status: COMPLETED | OUTPATIENT
Start: 2024-02-22 | End: 2024-02-22

## 2024-02-22 RX ORDER — INSULIN LISPRO 100/ML
VIAL (ML) SUBCUTANEOUS AT BEDTIME
Refills: 0 | Status: DISCONTINUED | OUTPATIENT
Start: 2024-02-22 | End: 2024-02-22

## 2024-02-22 RX ORDER — SODIUM CHLORIDE 9 MG/ML
500 INJECTION, SOLUTION INTRAVENOUS ONCE
Refills: 0 | Status: COMPLETED | OUTPATIENT
Start: 2024-02-22 | End: 2024-02-22

## 2024-02-22 RX ADMIN — INSULIN GLARGINE 6 UNIT(S): 100 INJECTION, SOLUTION SUBCUTANEOUS at 11:18

## 2024-02-22 RX ADMIN — Medication 650 MILLIGRAM(S): at 06:35

## 2024-02-22 RX ADMIN — Medication 2: at 11:18

## 2024-02-22 RX ADMIN — Medication 100 MILLIEQUIVALENT(S): at 10:16

## 2024-02-22 RX ADMIN — Medication 650 MILLIGRAM(S): at 07:30

## 2024-02-22 RX ADMIN — ENOXAPARIN SODIUM 40 MILLIGRAM(S): 100 INJECTION SUBCUTANEOUS at 11:17

## 2024-02-22 RX ADMIN — SODIUM CHLORIDE 250 MILLILITER(S): 9 INJECTION, SOLUTION INTRAVENOUS at 03:34

## 2024-02-22 RX ADMIN — Medication 40 MILLIEQUIVALENT(S): at 10:19

## 2024-02-22 RX ADMIN — INSULIN GLARGINE 4 UNIT(S): 100 INJECTION, SOLUTION SUBCUTANEOUS at 15:07

## 2024-02-22 RX ADMIN — INSULIN HUMAN 2 UNIT(S)/HR: 100 INJECTION, SOLUTION SUBCUTANEOUS at 20:24

## 2024-02-22 RX ADMIN — Medication 100 MILLIEQUIVALENT(S): at 08:04

## 2024-02-22 RX ADMIN — Medication 3: at 16:44

## 2024-02-22 RX ADMIN — Medication 40 MILLIEQUIVALENT(S): at 06:35

## 2024-02-22 RX ADMIN — SODIUM CHLORIDE 1000 MILLILITER(S): 9 INJECTION, SOLUTION INTRAVENOUS at 14:17

## 2024-02-22 RX ADMIN — Medication 3 UNIT(S): at 16:43

## 2024-02-22 RX ADMIN — Medication 25 GRAM(S): at 06:35

## 2024-02-22 RX ADMIN — Medication 100 MILLIEQUIVALENT(S): at 08:59

## 2024-02-22 RX ADMIN — Medication 1: at 07:46

## 2024-02-22 RX ADMIN — SODIUM CHLORIDE 250 MILLILITER(S): 9 INJECTION, SOLUTION INTRAVENOUS at 20:23

## 2024-02-22 NOTE — CONSULT NOTE ADULT - SUBJECTIVE AND OBJECTIVE BOX
Patient is a 23y old  Male who presents with a chief complaint of abdominal pain/dka (22 Feb 2024 11:33)    HPI:  Patient is a 22 y/o male with no pmhx. Comes into the ED complaining of abdominal pain. He also mentioned recent increase in urinary frequency, feeling thirsty all the time , constipation, and new weight loss of about 10 lbs since 2 weeks.   Patient denied any recent illness, denies fever, n/v/d, or recent travel. Patient also mentioned he was not aware of any family members who have diabetes type 1 or 2.   While in the ED, patient's BG was found to be 360, anion gap of 25, ph of 7.25. Patient was determined to be in DKA and MICU was consulted for further management.  A1c 11.8%.      PAST MEDICAL & SURGICAL HISTORY:  No pertinent past medical history    No significant past surgical history        Social History:  lives at home, works as a        FAMILY HISTORY:  No pertinent family history in first degree relatives          Allergies    No Known Allergies            REVIEW OF SYSTEMS:    CONSTITUTIONAL: No fever, + weight loss, + fatigue  EYES: No eye pain, visual disturbances, or discharge  ENMT:  No difficulty hearing, tinnitus, vertigo; No sinus or throat pain  NECK: No pain or stiffness  RESPIRATORY: No cough, wheezing, chills or hemoptysis; No shortness of breath  CARDIOVASCULAR: No chest pain, palpitations, dizziness, or leg swelling  GASTROINTESTINAL: + abdominal  pain. No nausea, vomiting, or hematemesis;   No diarrhea or constipation. No melena or hematochezia.  NEUROLOGICAL: No headaches, memory loss, loss of strength, numbness, or tremors  SKIN: No itching, burning, rashes, or lesions   MUSCULOSKELETAL: No joint pain or swelling; No muscle, back, or extremity pain  PSYCHIATRIC: No depression, anxiety, mood swings, or difficulty sleeping        MEDICATIONS  (STANDING):  chlorhexidine 2% Cloths 1 Application(s) Topical <User Schedule>  dextrose 50% Injectable 25 Gram(s) IV Push once  dextrose 50% Injectable 25 Gram(s) IV Push once  dextrose 50% Injectable 12.5 Gram(s) IV Push once  enoxaparin Injectable 40 milliGRAM(s) SubCutaneous every 24 hours  influenza   Vaccine 0.5 milliLiter(s) IntraMuscular once  insulin glargine Injectable (LANTUS) 6 Unit(s) SubCutaneous every morning  insulin lispro (ADMELOG) corrective regimen sliding scale   SubCutaneous three times a day before meals  insulin lispro (ADMELOG) corrective regimen sliding scale   SubCutaneous at bedtime    MEDICATIONS  (PRN):  acetaminophen     Tablet .. 650 milliGRAM(s) Oral every 6 hours PRN Mild Pain (1 - 3)      Vital Signs Last 24 Hrs  T(C): 36.7 (22 Feb 2024 12:04), Max: 36.9 (21 Feb 2024 23:00)  T(F): 98 (22 Feb 2024 12:04), Max: 98.5 (21 Feb 2024 23:00)  HR: 79 (22 Feb 2024 12:00) (58 - 86)  BP: 117/67 (22 Feb 2024 12:00) (103/74 - 156/82)  BP(mean): 82 (22 Feb 2024 12:00) (70 - 95)  RR: 19 (22 Feb 2024 12:00) (13 - 20)  SpO2: 97% (22 Feb 2024 12:00) (97% - 100%)    Parameters below as of 22 Feb 2024 12:00  Patient On (Oxygen Delivery Method): room air          PHYSICAL EXAM:    Constitutional: NAD, well-groomed, well-developed  HEENT: PERRL,  no exophalmos  Neck: No LAD,  no thyroid enlargement  Respiratory: CTAB  Cardiovascular: S1 and S2, RRR, no M/G/R  Gastrointestinal: BS+, soft, no organomegALY or mass  Extremities: No peripheral edema, no pedal lesions  Vascular: 2+ peripheral pulses  Neurological: A/O x 3, no focal deficits  Psychiatric: Normal mood, normal affect  Skin: No rashes, no acanthosis        LABS  02-22    129<L>  |  95<L>  |  5.4<L>  ----------------------------<  279<H>  5.0   |  17.0<L>  |  0.66    Ca    7.9<L>      22 Feb 2024 12:32  Phos  3.1     02-22  Mg     1.5     02-22    TPro  5.8<L>  /  Alb  3.6  /  TBili  0.7  /  DBili  x   /  AST  15  /  ALT  17  /  AlkPhos  96  02-22                          12.4   4.74  )-----------( 202      ( 22 Feb 2024 04:45 )             34.0       A1C with Estimated Average Glucose Result: 11.8 % (02-22-24 @ 12:32)        Ketone - Urine: >=160 mg/dL (02-21 @ 19:30)    Aspartate Aminotransferase (AST/SGOT): 15 U/L (02-22-24 @ 12:32)  Alkaline Phosphatase: 96 U/L (02-22-24 @ 12:32)  Alanine Aminotransferase (ALT/SGPT): 17 U/L (02-22-24 @ 12:32)  Albumin: 3.6 g/dL (02-22-24 @ 12:32)  Aspartate Aminotransferase (AST/SGOT): 11 U/L (02-22-24 @ 04:45)  Alanine Aminotransferase (ALT/SGPT): 14 U/L (02-22-24 @ 04:45)  Alkaline Phosphatase: 87 U/L (02-22-24 @ 04:45)  Albumin: 3.3 g/dL (02-22-24 @ 04:45)  Alkaline Phosphatase: 132 U/L (02-21-24 @ 19:30)  Albumin: 4.6 g/dL (02-21-24 @ 19:30)  Aspartate Aminotransferase (AST/SGOT): 16 U/L (02-21-24 @ 19:30)  Alanine Aminotransferase (ALT/SGPT): 23 U/L (02-21-24 @ 19:30)      CAPILLARY BLOOD GLUCOSE      POCT Blood Glucose.: 301 mg/dL (22 Feb 2024 13:38)  POCT Blood Glucose.: 248 mg/dL (22 Feb 2024 11:09)  POCT Blood Glucose.: 156 mg/dL (22 Feb 2024 07:24)  POCT Blood Glucose.: 83 mg/dL (22 Feb 2024 04:42)  POCT Blood Glucose.: 103 mg/dL (22 Feb 2024 03:36)  POCT Blood Glucose.: 122 mg/dL (22 Feb 2024 01:59)  POCT Blood Glucose.: 160 mg/dL (22 Feb 2024 00:30)  POCT Blood Glucose.: 211 mg/dL (21 Feb 2024 23:11)  POCT Blood Glucose.: 273 mg/dL (21 Feb 2024 21:40)  POCT Blood Glucose.: 360 mg/dL (21 Feb 2024 18:54)

## 2024-02-22 NOTE — PROGRESS NOTE ADULT - SUBJECTIVE AND OBJECTIVE BOX
BRIEF HOSPITAL COURSE: Patient is a 22 y/o male with no pmhx who presented to the ED complaining of abdominal pain. He also mentioned recent increase in urinary frequency, feeling thirsty all the time, constipation, and new weight loss of about 10 lbs. Symptoms have been ongoing for a few weeks now. Patient denied any recent illness, denies fever, n/v/d, or recent travel. Patient also mentioned he was not aware of any family members who have diabetes type 1 or 2. While in the ED, patient's BG was found to be 360, anion gap of 25, pH of 7.25. Patient was determined to be in DKA and admitted to MICU for further management.     Events last 24 hours: anion gap closed following insulin gtt (10 units), receiving iv potassium repletion     PAST MEDICAL & SURGICAL HISTORY:  No pertinent past medical history    No significant past surgical history    Allergies    No Known Allergies    FAMILY HISTORY:  No pertinent family history in first degree relatives      Review of Systems (negative unless otherwise noted):  Patient reports feeling better, abdominal pain has subsided, no other complaints at this time.       Medications:    acetaminophen     Tablet .. 650 milliGRAM(s) Oral every 6 hours PRN    enoxaparin Injectable 40 milliGRAM(s) SubCutaneous every 24 hours    dextrose 50% Injectable 25 Gram(s) IV Push once  dextrose 50% Injectable 25 Gram(s) IV Push once  dextrose 50% Injectable 12.5 Gram(s) IV Push once  insulin glargine Injectable (LANTUS) 6 Unit(s) SubCutaneous every morning  insulin lispro (ADMELOG) corrective regimen sliding scale   SubCutaneous at bedtime  insulin lispro (ADMELOG) corrective regimen sliding scale   SubCutaneous three times a day before meals    influenza   Vaccine 0.5 milliLiter(s) IntraMuscular once    chlorhexidine 2% Cloths 1 Application(s) Topical <User Schedule>        ICU Vital Signs Last 24 Hrs  T(C): 36.6 (22 Feb 2024 08:47), Max: 36.9 (21 Feb 2024 23:00)  T(F): 97.9 (22 Feb 2024 08:47), Max: 98.5 (21 Feb 2024 23:00)  HR: 72 (22 Feb 2024 10:00) (58 - 86)  BP: 113/68 (22 Feb 2024 10:00) (103/74 - 156/82)  BP(mean): 80 (22 Feb 2024 10:00) (70 - 95)  ABP: --  ABP(mean): --  RR: 17 (22 Feb 2024 10:00) (13 - 20)  SpO2: 98% (22 Feb 2024 10:00) (98% - 100%)    O2 Parameters below as of 22 Feb 2024 08:00  Patient On (Oxygen Delivery Method): room air        I&O's Detail    21 Feb 2024 07:01  -  22 Feb 2024 07:00  --------------------------------------------------------  IN:    dextrose 5% + lactated ringers: 1750 mL    Insulin: 6 mL    Insulin: 10 mL    Lactated Ringers Bolus: 1000 mL  Total IN: 2766 mL    OUT:    Voided (mL): 800 mL  Total OUT: 800 mL    Total NET: 1966 mL      22 Feb 2024 07:01  -  22 Feb 2024 11:43  --------------------------------------------------------  IN:    IV PiggyBack: 100 mL  Total IN: 100 mL    OUT:  Total OUT: 0 mL    Total NET: 100 mL          LABS:                        12.4   4.74  )-----------( 202      ( 22 Feb 2024 04:45 )             34.0     02-22    138  |  104  |  6.6<L>  ----------------------------<  76  2.9<LL>   |  26.0  |  0.65    Ca    8.0<L>      22 Feb 2024 04:45  Phos  3.1     02-22  Mg     1.5     02-22    TPro  5.3<L>  /  Alb  3.3  /  TBili  0.6  /  DBili  x   /  AST  11  /  ALT  14  /  AlkPhos  87  02-22      POCT Blood Glucose.: 248 mg/dL (22 Feb 2024 11:09)      Urinalysis Basic - ( 22 Feb 2024 04:45 )    Color: x / Appearance: x / SG: x / pH: x  Gluc: 76 mg/dL / Ketone: x  / Bili: x / Urobili: x   Blood: x / Protein: x / Nitrite: x   Leuk Esterase: x / RBC: x / WBC x   Sq Epi: x / Non Sq Epi: x / Bacteria: x      Physical Examination:  General: No acute distress.  Alert, oriented, interactive.  HEENT: Pupils equal, reactive to light.  Symmetric.  PULM: Clear to auscultation bilaterally, no significant sputum production.  CVS: Regular rate and rhythm, no murmurs, rubs, or gallops.  ABD: Soft, nondistended, nontender.  EXT: No edema, nontender  SKIN: Warm and well perfused, no rashes noted. BRIEF HOSPITAL COURSE: Patient is a 24 y/o male with no pmhx who presented to the ED complaining of abdominal pain. He also mentioned recent increase in urinary frequency, feeling thirsty all the time, constipation, and new weight loss of about 10 lbs; Ssymptoms started a few weeks ago. Patient denied any recent illness, denies fever, n/v/d, or recent travel. Patient also mentioned he was not aware of any family members who have diabetes type 1 or 2. While in the ED, patient's BG was found to be 360mg/dl w/ anion gap of 25, pH of 7.25. Patient was determined to be in DKA and admitted to MICU for further management.     Events last 24 hours: anion gap closed following insulin gtt (10 units), received po + iv potassium repletion     PAST MEDICAL & SURGICAL HISTORY:  No pertinent past medical history    No significant past surgical history    Allergies    No Known Allergies    FAMILY HISTORY:  No pertinent family history in first degree relatives      Review of Systems (negative unless otherwise noted):  Patient reports feeling better, abdominal pain has subsided, no other complaints at this time.       Medications:    acetaminophen     Tablet .. 650 milliGRAM(s) Oral every 6 hours PRN    enoxaparin Injectable 40 milliGRAM(s) SubCutaneous every 24 hours    dextrose 50% Injectable 25 Gram(s) IV Push once  dextrose 50% Injectable 25 Gram(s) IV Push once  dextrose 50% Injectable 12.5 Gram(s) IV Push once  insulin glargine Injectable (LANTUS) 6 Unit(s) SubCutaneous every morning  insulin lispro (ADMELOG) corrective regimen sliding scale   SubCutaneous at bedtime  insulin lispro (ADMELOG) corrective regimen sliding scale   SubCutaneous three times a day before meals    influenza   Vaccine 0.5 milliLiter(s) IntraMuscular once    chlorhexidine 2% Cloths 1 Application(s) Topical <User Schedule>        ICU Vital Signs Last 24 Hrs  T(C): 36.6 (22 Feb 2024 08:47), Max: 36.9 (21 Feb 2024 23:00)  T(F): 97.9 (22 Feb 2024 08:47), Max: 98.5 (21 Feb 2024 23:00)  HR: 72 (22 Feb 2024 10:00) (58 - 86)  BP: 113/68 (22 Feb 2024 10:00) (103/74 - 156/82)  BP(mean): 80 (22 Feb 2024 10:00) (70 - 95)  ABP: --  ABP(mean): --  RR: 17 (22 Feb 2024 10:00) (13 - 20)  SpO2: 98% (22 Feb 2024 10:00) (98% - 100%)    O2 Parameters below as of 22 Feb 2024 08:00  Patient On (Oxygen Delivery Method): room air        I&O's Detail    21 Feb 2024 07:01  -  22 Feb 2024 07:00  --------------------------------------------------------  IN:    dextrose 5% + lactated ringers: 1750 mL    Insulin: 6 mL    Insulin: 10 mL    Lactated Ringers Bolus: 1000 mL  Total IN: 2766 mL    OUT:    Voided (mL): 800 mL  Total OUT: 800 mL    Total NET: 1966 mL      22 Feb 2024 07:01  -  22 Feb 2024 11:43  --------------------------------------------------------  IN:    IV PiggyBack: 100 mL  Total IN: 100 mL    OUT:  Total OUT: 0 mL    Total NET: 100 mL          LABS:                        12.4   4.74  )-----------( 202      ( 22 Feb 2024 04:45 )             34.0     02-22    138  |  104  |  6.6<L>  ----------------------------<  76  2.9<LL>   |  26.0  |  0.65    Ca    8.0<L>      22 Feb 2024 04:45  Phos  3.1     02-22  Mg     1.5     02-22    TPro  5.3<L>  /  Alb  3.3  /  TBili  0.6  /  DBili  x   /  AST  11  /  ALT  14  /  AlkPhos  87  02-22      POCT Blood Glucose.: 248 mg/dL (22 Feb 2024 11:09)      Urinalysis Basic - ( 22 Feb 2024 04:45 )    Color: x / Appearance: x / SG: x / pH: x  Gluc: 76 mg/dL / Ketone: x  / Bili: x / Urobili: x   Blood: x / Protein: x / Nitrite: x   Leuk Esterase: x / RBC: x / WBC x   Sq Epi: x / Non Sq Epi: x / Bacteria: x      Physical Examination:  General: No acute distress.  Alert, oriented, interactive.  HEENT: Pupils equal, reactive to light.  Symmetric.  PULM: Clear to auscultation bilaterally, no significant sputum production.  CVS: Regular rate and rhythm, no murmurs, rubs, or gallops.  ABD: Soft, nondistended, nontender.  EXT: No edema, nontender  SKIN: Warm and well perfused, no rashes noted.   BRIEF HOSPITAL COURSE: Patient is a 22 y/o male with no pmhx who presented to the ED complaining of abdominal pain. He also mentioned recent increase in urinary frequency, feeling thirsty all the time, constipation, and new weight loss of about 10 lbs; Symptoms started a few weeks ago. Patient denied any recent illness, denies fever, n/v/d, or recent travel. Patient also mentioned he was not aware of any family members who have diabetes type 1 or 2. While in the ED, patient's BG was found to be 360mg/dl w/ anion gap of 25, pH of 7.25. Patient was determined to be in DKA and admitted to MICU for further management.     Events last 24 hours: anion gap closed following insulin gtt (10 units), received po + iv potassium repletion     PAST MEDICAL & SURGICAL HISTORY:  No pertinent past medical history    No significant past surgical history    Allergies    No Known Allergies    FAMILY HISTORY:  No pertinent family history in first degree relatives      Review of Systems (negative unless otherwise noted):  Patient reports feeling better, abdominal pain has subsided, no other complaints at this time.       Medications:    acetaminophen     Tablet .. 650 milliGRAM(s) Oral every 6 hours PRN    enoxaparin Injectable 40 milliGRAM(s) SubCutaneous every 24 hours    dextrose 50% Injectable 25 Gram(s) IV Push once  dextrose 50% Injectable 25 Gram(s) IV Push once  dextrose 50% Injectable 12.5 Gram(s) IV Push once  insulin glargine Injectable (LANTUS) 6 Unit(s) SubCutaneous every morning  insulin lispro (ADMELOG) corrective regimen sliding scale   SubCutaneous at bedtime  insulin lispro (ADMELOG) corrective regimen sliding scale   SubCutaneous three times a day before meals    influenza   Vaccine 0.5 milliLiter(s) IntraMuscular once    chlorhexidine 2% Cloths 1 Application(s) Topical <User Schedule>        ICU Vital Signs Last 24 Hrs  T(C): 36.6 (22 Feb 2024 08:47), Max: 36.9 (21 Feb 2024 23:00)  T(F): 97.9 (22 Feb 2024 08:47), Max: 98.5 (21 Feb 2024 23:00)  HR: 72 (22 Feb 2024 10:00) (58 - 86)  BP: 113/68 (22 Feb 2024 10:00) (103/74 - 156/82)  BP(mean): 80 (22 Feb 2024 10:00) (70 - 95)  ABP: --  ABP(mean): --  RR: 17 (22 Feb 2024 10:00) (13 - 20)  SpO2: 98% (22 Feb 2024 10:00) (98% - 100%)    O2 Parameters below as of 22 Feb 2024 08:00  Patient On (Oxygen Delivery Method): room air        I&O's Detail    21 Feb 2024 07:01  -  22 Feb 2024 07:00  --------------------------------------------------------  IN:    dextrose 5% + lactated ringers: 1750 mL    Insulin: 6 mL    Insulin: 10 mL    Lactated Ringers Bolus: 1000 mL  Total IN: 2766 mL    OUT:    Voided (mL): 800 mL  Total OUT: 800 mL    Total NET: 1966 mL      22 Feb 2024 07:01  -  22 Feb 2024 11:43  --------------------------------------------------------  IN:    IV PiggyBack: 100 mL  Total IN: 100 mL    OUT:  Total OUT: 0 mL    Total NET: 100 mL          LABS:                        12.4   4.74  )-----------( 202      ( 22 Feb 2024 04:45 )             34.0     02-22    138  |  104  |  6.6<L>  ----------------------------<  76  2.9<LL>   |  26.0  |  0.65    Ca    8.0<L>      22 Feb 2024 04:45  Phos  3.1     02-22  Mg     1.5     02-22    TPro  5.3<L>  /  Alb  3.3  /  TBili  0.6  /  DBili  x   /  AST  11  /  ALT  14  /  AlkPhos  87  02-22      POCT Blood Glucose.: 248 mg/dL (22 Feb 2024 11:09)      Urinalysis Basic - ( 22 Feb 2024 04:45 )    Color: x / Appearance: x / SG: x / pH: x  Gluc: 76 mg/dL / Ketone: x  / Bili: x / Urobili: x   Blood: x / Protein: x / Nitrite: x   Leuk Esterase: x / RBC: x / WBC x   Sq Epi: x / Non Sq Epi: x / Bacteria: x      Physical Examination:  General: No acute distress.  Alert, oriented, interactive.  HEENT: Pupils equal, reactive to light.  Symmetric.  PULM: Clear to auscultation bilaterally, no significant sputum production.  CVS: Regular rate and rhythm, no murmurs, rubs, or gallops.  ABD: Soft, nondistended, nontender.  EXT: No edema, nontender  SKIN: Warm and well perfused, no rashes noted.

## 2024-02-22 NOTE — CHART NOTE - NSCHARTNOTEFT_GEN_A_CORE
MICU Transfer Note  ---------------------------    Transfer from: MICU  Transfer to:  ( x ) Medicine    (  ) Telemetry    (  ) RCU    (  ) Palliative    (  ) Stroke Unit      Accepting Physician:      MICU COURSE: Patient is a 22 y/o male with no pmhx who presented to the ED on 2/21 complaining of abdominal pain. He also mentioned recent increase in urinary frequency, feeling thirsty all the time, constipation, and new weight loss of about 10 lbs; Symptoms started a few weeks ago. Patient denied any recent illness, denies fever, n/v/d, or recent travel. Patient also mentioned he was not aware of any family members who have diabetes type 1 or 2. While in the ED, patient's BG was found to be 360mg/dl w/ anion gap of 25, pH of 7.25. Patient was determined to be in DKA and admitted to MICU for further management.     Events last 24 hours: anion gap closed following insulin gtt (10 units), received po + iv potassium repletion           OBJECTIVE --  Vital Signs Last 24 Hrs  T(C): 36.7 (22 Feb 2024 12:04), Max: 36.9 (21 Feb 2024 23:00)  T(F): 98 (22 Feb 2024 12:04), Max: 98.5 (21 Feb 2024 23:00)  HR: 72 (22 Feb 2024 11:00) (58 - 86)  BP: 115/65 (22 Feb 2024 11:00) (103/74 - 156/82)  BP(mean): 80 (22 Feb 2024 11:00) (70 - 95)  RR: 17 (22 Feb 2024 11:00) (13 - 20)  SpO2: 99% (22 Feb 2024 11:00) (98% - 100%)    Parameters below as of 22 Feb 2024 08:00  Patient On (Oxygen Delivery Method): room air        I&O's Summary    21 Feb 2024 07:01  -  22 Feb 2024 07:00  --------------------------------------------------------  IN: 2766 mL / OUT: 800 mL / NET: 1966 mL    22 Feb 2024 07:01  -  22 Feb 2024 12:20  --------------------------------------------------------  IN: 300 mL / OUT: 700 mL / NET: -400 mL        MEDICATIONS  (STANDING):  chlorhexidine 2% Cloths 1 Application(s) Topical <User Schedule>  dextrose 50% Injectable 25 Gram(s) IV Push once  dextrose 50% Injectable 25 Gram(s) IV Push once  dextrose 50% Injectable 12.5 Gram(s) IV Push once  enoxaparin Injectable 40 milliGRAM(s) SubCutaneous every 24 hours  influenza   Vaccine 0.5 milliLiter(s) IntraMuscular once  insulin glargine Injectable (LANTUS) 6 Unit(s) SubCutaneous every morning  insulin lispro (ADMELOG) corrective regimen sliding scale   SubCutaneous three times a day before meals  insulin lispro (ADMELOG) corrective regimen sliding scale   SubCutaneous at bedtime    MEDICATIONS  (PRN):  acetaminophen     Tablet .. 650 milliGRAM(s) Oral every 6 hours PRN Mild Pain (1 - 3)        LABS                                            12.4                  Neurophils% (auto):   36.3   (02-22 @ 04:45):    4.74 )-----------(202          Lymphocytes% (auto):  47.7                                          34.0                   Eosinphils% (auto):   3.0      Manual%: Neutrophils x    ; Lymphocytes x    ; Eosinophils x    ; Bands%: x    ; Blasts x                                    138    |  104    |  6.6                 Calcium: 8.0   / iCa: x      (02-22 @ 04:45)    ----------------------------<  76        Magnesium: 1.5                              2.9     |  26.0   |  0.65             Phosphorous: 3.1      TPro  5.3    /  Alb  3.3    /  TBili  0.6    /  DBili  x      /  AST  11     /  ALT  14     /  AlkPhos  87     22 Feb 2024 04:45            ASSESSMENT & PLAN:         For Follow-Up:  [ ]   [ ] MICU Transfer Note  ---------------------------    Transfer from: MICU  Transfer to:  ( x ) Medicine    (  ) Telemetry    (  ) RCU    (  ) Palliative    (  ) Stroke Unit      Accepting Physician:      MICU COURSE: Patient is a 24 y/o male with no pmhx who presented to the ED on 2/21 complaining of abdominal pain. He also mentioned recent increase in urinary frequency, feeling thirsty all the time, constipation, and new weight loss of about 10 lbs; Symptoms started a few weeks ago. Patient denied any recent illness, denies fever, n/v/d, or recent travel. Patient also mentioned he was not aware of any family members who have diabetes type 1 or 2. While in the ED, patient's BG was found to be 360mg/dl w/ anion gap of 25, pH of 7.25. Patient was determined to be in DKA and admitted to MICU for further management. The anion gap has since closed following insulin gtt (10 units), the patient also received po + iv potassium repletion.      OBJECTIVE   Vital Signs Last 24 Hrs  T(C): 36.7 (22 Feb 2024 12:04), Max: 36.9 (21 Feb 2024 23:00)  T(F): 98 (22 Feb 2024 12:04), Max: 98.5 (21 Feb 2024 23:00)  HR: 72 (22 Feb 2024 11:00) (58 - 86)  BP: 115/65 (22 Feb 2024 11:00) (103/74 - 156/82)  BP(mean): 80 (22 Feb 2024 11:00) (70 - 95)  RR: 17 (22 Feb 2024 11:00) (13 - 20)  SpO2: 99% (22 Feb 2024 11:00) (98% - 100%)    Parameters below as of 22 Feb 2024 08:00  Patient On (Oxygen Delivery Method): room air        I&O's Summary    21 Feb 2024 07:01  -  22 Feb 2024 07:00  --------------------------------------------------------  IN: 2766 mL / OUT: 800 mL / NET: 1966 mL    22 Feb 2024 07:01  -  22 Feb 2024 12:20  --------------------------------------------------------  IN: 300 mL / OUT: 700 mL / NET: -400 mL        MEDICATIONS  (STANDING):  chlorhexidine 2% Cloths 1 Application(s) Topical <User Schedule>  dextrose 50% Injectable 25 Gram(s) IV Push once  dextrose 50% Injectable 25 Gram(s) IV Push once  dextrose 50% Injectable 12.5 Gram(s) IV Push once  enoxaparin Injectable 40 milliGRAM(s) SubCutaneous every 24 hours  influenza   Vaccine 0.5 milliLiter(s) IntraMuscular once  insulin glargine Injectable (LANTUS) 6 Unit(s) SubCutaneous every morning  insulin lispro (ADMELOG) corrective regimen sliding scale   SubCutaneous three times a day before meals  insulin lispro (ADMELOG) corrective regimen sliding scale   SubCutaneous at bedtime    MEDICATIONS  (PRN):  acetaminophen     Tablet .. 650 milliGRAM(s) Oral every 6 hours PRN Mild Pain (1 - 3)        LABS                                            12.4                  Neurophils% (auto):   36.3   (02-22 @ 04:45):    4.74 )-----------(202          Lymphocytes% (auto):  47.7                                          34.0                   Eosinphils% (auto):   3.0      Manual%: Neutrophils x    ; Lymphocytes x    ; Eosinophils x    ; Bands%: x    ; Blasts x                                    138    |  104    |  6.6                 Calcium: 8.0   / iCa: x      (02-22 @ 04:45)    ----------------------------<  76        Magnesium: 1.5                              2.9     |  26.0   |  0.65             Phosphorous: 3.1      TPro  5.3    /  Alb  3.3    /  TBili  0.6    /  DBili  x      /  AST  11     /  ALT  14     /  AlkPhos  87     22 Feb 2024 04:45          ASSESSMENT & PLAN:     24yo male with newly diagnosed T1DM complicated by DKA, hypokalemic on most recent labs.     -s/p insulin infusion w/ resolution: euglycemic, anion gap closed   -transitioned to Lantus/sliding scale   -2x 40mEq PO K+ and 3x 10mEq IV K+ administered, potassium levels on f/u BMP pending  -endocrinology consult initiated, f/u with recommendations  -patient stable for downgrade MICU Transfer Note  ---------------------------    Transfer from: MICU  Transfer to:  ( x ) Medicine    (  ) Telemetry    (  ) RCU    (  ) Palliative    (  ) Stroke Unit      Accepting Physician: Dr. Mccall      MICU COURSE: Patient is a 22 y/o male with no pmhx who presented to the ED on 2/21 complaining of abdominal pain. He also mentioned recent increase in urinary frequency, feeling thirsty all the time, constipation, and new weight loss of about 10 lbs; Symptoms started a few weeks ago. Patient denied any recent illness, denies fever, n/v/d, or recent travel. Patient also mentioned he was not aware of any family members who have diabetes type 1 or 2. While in the ED, patient's BG was found to be 360mg/dl w/ anion gap of 25, pH of 7.25. Patient was determined to be in DKA and admitted to MICU for further management. The anion gap has since closed following insulin gtt (10 units), the patient also received po + iv potassium repletion.      OBJECTIVE   Vital Signs Last 24 Hrs  T(C): 36.7 (22 Feb 2024 12:04), Max: 36.9 (21 Feb 2024 23:00)  T(F): 98 (22 Feb 2024 12:04), Max: 98.5 (21 Feb 2024 23:00)  HR: 72 (22 Feb 2024 11:00) (58 - 86)  BP: 115/65 (22 Feb 2024 11:00) (103/74 - 156/82)  BP(mean): 80 (22 Feb 2024 11:00) (70 - 95)  RR: 17 (22 Feb 2024 11:00) (13 - 20)  SpO2: 99% (22 Feb 2024 11:00) (98% - 100%)    Parameters below as of 22 Feb 2024 08:00  Patient On (Oxygen Delivery Method): room air        I&O's Summary    21 Feb 2024 07:01  -  22 Feb 2024 07:00  --------------------------------------------------------  IN: 2766 mL / OUT: 800 mL / NET: 1966 mL    22 Feb 2024 07:01  -  22 Feb 2024 12:20  --------------------------------------------------------  IN: 300 mL / OUT: 700 mL / NET: -400 mL        MEDICATIONS  (STANDING):  chlorhexidine 2% Cloths 1 Application(s) Topical <User Schedule>  dextrose 50% Injectable 25 Gram(s) IV Push once  dextrose 50% Injectable 25 Gram(s) IV Push once  dextrose 50% Injectable 12.5 Gram(s) IV Push once  enoxaparin Injectable 40 milliGRAM(s) SubCutaneous every 24 hours  influenza   Vaccine 0.5 milliLiter(s) IntraMuscular once  insulin glargine Injectable (LANTUS) 6 Unit(s) SubCutaneous every morning  insulin lispro (ADMELOG) corrective regimen sliding scale   SubCutaneous three times a day before meals  insulin lispro (ADMELOG) corrective regimen sliding scale   SubCutaneous at bedtime    MEDICATIONS  (PRN):  acetaminophen     Tablet .. 650 milliGRAM(s) Oral every 6 hours PRN Mild Pain (1 - 3)        LABS                                            12.4                  Neurophils% (auto):   36.3   (02-22 @ 04:45):    4.74 )-----------(202          Lymphocytes% (auto):  47.7                                          34.0                   Eosinphils% (auto):   3.0      Manual%: Neutrophils x    ; Lymphocytes x    ; Eosinophils x    ; Bands%: x    ; Blasts x                                    138    |  104    |  6.6                 Calcium: 8.0   / iCa: x      (02-22 @ 04:45)    ----------------------------<  76        Magnesium: 1.5                              2.9     |  26.0   |  0.65             Phosphorous: 3.1      TPro  5.3    /  Alb  3.3    /  TBili  0.6    /  DBili  x      /  AST  11     /  ALT  14     /  AlkPhos  87     22 Feb 2024 04:45          ASSESSMENT & PLAN:     24yo male with newly diagnosed T1DM complicated by DKA, hypokalemic on most recent labs.     -s/p insulin infusion w/ resolution: euglycemic, anion gap closed   -transitioned to Lantus/sliding scale   -2x 40mEq PO K+ and 3x 10mEq IV K+ administered, potassium levels on f/u BMP pending  -endocrinology consult initiated, f/u with recommendations  -patient stable for downgrade

## 2024-02-22 NOTE — PROGRESS NOTE ADULT - ATTENDING COMMENTS
24 yo male with no known pmh now presents with new onset DKA, now resolved, transitioned to lantus and lispro.  No health insurance, will likely need to be switched to something more affordable than lantus/lispro on discharge.

## 2024-02-22 NOTE — PROGRESS NOTE ADULT - ASSESSMENT
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     -s/p insulin infusion w/ resolution: euglycemic, anion gap closed   -transitioned to Lantus/sliding scale   -2x 40mEq PO K+ and 3x 10mEq IV K+ administered, potassium levels on f/u BMP pending  -endocrinology consult initiated, f/u with recommendations  -patient stable for downgrade

## 2024-02-22 NOTE — CONSULT NOTE ADULT - ASSESSMENT
Patient is a 22 y/o male with no pmhx. Comes into the ED complaining of abdominal pain. He also mentioned recent increase in urinary frequency, feeling thirsty all the time , constipation, and new weight loss of about 10 lbs since 2 weeks.   While in the ED, patient's BG was found to be 360, anion gap of 25, ph of 7.25. Patient was determined to be in DKA and MICU was consulted for further management.  A1c 11.8%.C-peptide 0.6, new onset DM type 1.      DM type 1, new onset, DKA now resolved:  now transitioned off insulin gtt, got 6 units of lantus this morning, now sugars going up.  -Will recommend to get MILTON 65 ab, anti insulin ab.  -Will recommend to give lantus 10 units daily, start admelog 3 units tid with meals and ssi.  -check fingerstick ac tid hs.  -diabetic diet.  -diabetic teaching.  -insulin teaching.    Hypokalemia:  -monitor lytes  -replace as needed.

## 2024-02-23 LAB
ALBUMIN SERPL ELPH-MCNC: 3.2 G/DL — LOW (ref 3.3–5.2)
ALP SERPL-CCNC: 82 U/L — SIGNIFICANT CHANGE UP (ref 40–120)
ALT FLD-CCNC: 14 U/L — SIGNIFICANT CHANGE UP
ANION GAP SERPL CALC-SCNC: 10 MMOL/L — SIGNIFICANT CHANGE UP (ref 5–17)
ANION GAP SERPL CALC-SCNC: 8 MMOL/L — SIGNIFICANT CHANGE UP (ref 5–17)
AST SERPL-CCNC: 16 U/L — SIGNIFICANT CHANGE UP
BASOPHILS # BLD AUTO: 0.01 K/UL — SIGNIFICANT CHANGE UP (ref 0–0.2)
BASOPHILS NFR BLD AUTO: 0.3 % — SIGNIFICANT CHANGE UP (ref 0–2)
BILIRUB SERPL-MCNC: 0.8 MG/DL — SIGNIFICANT CHANGE UP (ref 0.4–2)
BUN SERPL-MCNC: 4.9 MG/DL — LOW (ref 8–20)
BUN SERPL-MCNC: 6.2 MG/DL — LOW (ref 8–20)
CALCIUM SERPL-MCNC: 8 MG/DL — LOW (ref 8.4–10.5)
CALCIUM SERPL-MCNC: 8 MG/DL — LOW (ref 8.4–10.5)
CHLORIDE SERPL-SCNC: 102 MMOL/L — SIGNIFICANT CHANGE UP (ref 96–108)
CHLORIDE SERPL-SCNC: 102 MMOL/L — SIGNIFICANT CHANGE UP (ref 96–108)
CO2 SERPL-SCNC: 27 MMOL/L — SIGNIFICANT CHANGE UP (ref 22–29)
CO2 SERPL-SCNC: 28 MMOL/L — SIGNIFICANT CHANGE UP (ref 22–29)
CREAT SERPL-MCNC: 0.5 MG/DL — SIGNIFICANT CHANGE UP (ref 0.5–1.3)
CREAT SERPL-MCNC: 0.52 MG/DL — SIGNIFICANT CHANGE UP (ref 0.5–1.3)
EGFR: 145 ML/MIN/1.73M2 — SIGNIFICANT CHANGE UP
EGFR: 147 ML/MIN/1.73M2 — SIGNIFICANT CHANGE UP
EOSINOPHIL # BLD AUTO: 0.11 K/UL — SIGNIFICANT CHANGE UP (ref 0–0.5)
EOSINOPHIL NFR BLD AUTO: 3 % — SIGNIFICANT CHANGE UP (ref 0–6)
GLUCOSE BLDC GLUCOMTR-MCNC: 101 MG/DL — HIGH (ref 70–99)
GLUCOSE BLDC GLUCOMTR-MCNC: 105 MG/DL — HIGH (ref 70–99)
GLUCOSE BLDC GLUCOMTR-MCNC: 105 MG/DL — HIGH (ref 70–99)
GLUCOSE BLDC GLUCOMTR-MCNC: 203 MG/DL — HIGH (ref 70–99)
GLUCOSE BLDC GLUCOMTR-MCNC: 217 MG/DL — HIGH (ref 70–99)
GLUCOSE BLDC GLUCOMTR-MCNC: 362 MG/DL — HIGH (ref 70–99)
GLUCOSE SERPL-MCNC: 104 MG/DL — HIGH (ref 70–99)
GLUCOSE SERPL-MCNC: 129 MG/DL — HIGH (ref 70–99)
HCT VFR BLD CALC: 35.8 % — LOW (ref 39–50)
HGB BLD-MCNC: 12.6 G/DL — LOW (ref 13–17)
IMM GRANULOCYTES NFR BLD AUTO: 0 % — SIGNIFICANT CHANGE UP (ref 0–0.9)
LYMPHOCYTES # BLD AUTO: 1.66 K/UL — SIGNIFICANT CHANGE UP (ref 1–3.3)
LYMPHOCYTES # BLD AUTO: 45.5 % — HIGH (ref 13–44)
MAGNESIUM SERPL-MCNC: 1.6 MG/DL — SIGNIFICANT CHANGE UP (ref 1.6–2.6)
MAGNESIUM SERPL-MCNC: 2.2 MG/DL — SIGNIFICANT CHANGE UP (ref 1.6–2.6)
MCHC RBC-ENTMCNC: 29.2 PG — SIGNIFICANT CHANGE UP (ref 27–34)
MCHC RBC-ENTMCNC: 35.2 GM/DL — SIGNIFICANT CHANGE UP (ref 32–36)
MCV RBC AUTO: 82.9 FL — SIGNIFICANT CHANGE UP (ref 80–100)
MONOCYTES # BLD AUTO: 0.41 K/UL — SIGNIFICANT CHANGE UP (ref 0–0.9)
MONOCYTES NFR BLD AUTO: 11.2 % — SIGNIFICANT CHANGE UP (ref 2–14)
NEUTROPHILS # BLD AUTO: 1.46 K/UL — LOW (ref 1.8–7.4)
NEUTROPHILS NFR BLD AUTO: 40 % — LOW (ref 43–77)
PHOSPHATE SERPL-MCNC: 2.4 MG/DL — SIGNIFICANT CHANGE UP (ref 2.4–4.7)
PHOSPHATE SERPL-MCNC: 3.6 MG/DL — SIGNIFICANT CHANGE UP (ref 2.4–4.7)
PLATELET # BLD AUTO: 191 K/UL — SIGNIFICANT CHANGE UP (ref 150–400)
POTASSIUM SERPL-MCNC: 3.2 MMOL/L — LOW (ref 3.5–5.3)
POTASSIUM SERPL-MCNC: 3.4 MMOL/L — LOW (ref 3.5–5.3)
POTASSIUM SERPL-SCNC: 3.2 MMOL/L — LOW (ref 3.5–5.3)
POTASSIUM SERPL-SCNC: 3.4 MMOL/L — LOW (ref 3.5–5.3)
PROT SERPL-MCNC: 5.3 G/DL — LOW (ref 6.6–8.7)
RBC # BLD: 4.32 M/UL — SIGNIFICANT CHANGE UP (ref 4.2–5.8)
RBC # FLD: 12.3 % — SIGNIFICANT CHANGE UP (ref 10.3–14.5)
SODIUM SERPL-SCNC: 138 MMOL/L — SIGNIFICANT CHANGE UP (ref 135–145)
SODIUM SERPL-SCNC: 139 MMOL/L — SIGNIFICANT CHANGE UP (ref 135–145)
WBC # BLD: 3.65 K/UL — LOW (ref 3.8–10.5)
WBC # FLD AUTO: 3.65 K/UL — LOW (ref 3.8–10.5)

## 2024-02-23 PROCEDURE — 99233 SBSQ HOSP IP/OBS HIGH 50: CPT

## 2024-02-23 PROCEDURE — 99232 SBSQ HOSP IP/OBS MODERATE 35: CPT

## 2024-02-23 RX ORDER — INSULIN GLARGINE 100 [IU]/ML
10 INJECTION, SOLUTION SUBCUTANEOUS EVERY MORNING
Refills: 0 | Status: DISCONTINUED | OUTPATIENT
Start: 2024-02-23 | End: 2024-02-24

## 2024-02-23 RX ORDER — POTASSIUM CHLORIDE 20 MEQ
20 PACKET (EA) ORAL
Refills: 0 | Status: DISCONTINUED | OUTPATIENT
Start: 2024-02-23 | End: 2024-02-23

## 2024-02-23 RX ORDER — MAGNESIUM SULFATE 500 MG/ML
2 VIAL (ML) INJECTION ONCE
Refills: 0 | Status: COMPLETED | OUTPATIENT
Start: 2024-02-23 | End: 2024-02-23

## 2024-02-23 RX ORDER — INSULIN LISPRO 100/ML
VIAL (ML) SUBCUTANEOUS AT BEDTIME
Refills: 0 | Status: DISCONTINUED | OUTPATIENT
Start: 2024-02-23 | End: 2024-02-24

## 2024-02-23 RX ORDER — INSULIN LISPRO 100/ML
3 VIAL (ML) SUBCUTANEOUS
Refills: 0 | Status: DISCONTINUED | OUTPATIENT
Start: 2024-02-23 | End: 2024-02-24

## 2024-02-23 RX ORDER — POTASSIUM CHLORIDE 20 MEQ
40 PACKET (EA) ORAL EVERY 4 HOURS
Refills: 0 | Status: COMPLETED | OUTPATIENT
Start: 2024-02-23 | End: 2024-02-23

## 2024-02-23 RX ORDER — INSULIN LISPRO 100/ML
VIAL (ML) SUBCUTANEOUS
Refills: 0 | Status: DISCONTINUED | OUTPATIENT
Start: 2024-02-23 | End: 2024-02-24

## 2024-02-23 RX ORDER — POTASSIUM CHLORIDE 20 MEQ
40 PACKET (EA) ORAL ONCE
Refills: 0 | Status: COMPLETED | OUTPATIENT
Start: 2024-02-23 | End: 2024-02-23

## 2024-02-23 RX ADMIN — Medication 40 MILLIEQUIVALENT(S): at 13:07

## 2024-02-23 RX ADMIN — ENOXAPARIN SODIUM 40 MILLIGRAM(S): 100 INJECTION SUBCUTANEOUS at 13:16

## 2024-02-23 RX ADMIN — INSULIN GLARGINE 10 UNIT(S): 100 INJECTION, SOLUTION SUBCUTANEOUS at 01:47

## 2024-02-23 RX ADMIN — Medication 3: at 21:30

## 2024-02-23 RX ADMIN — Medication 40 MILLIEQUIVALENT(S): at 04:51

## 2024-02-23 RX ADMIN — Medication 3: at 17:54

## 2024-02-23 RX ADMIN — Medication 50 GRAM(S): at 04:51

## 2024-02-23 RX ADMIN — Medication 40 MILLIEQUIVALENT(S): at 08:22

## 2024-02-23 RX ADMIN — Medication 3 UNIT(S): at 17:53

## 2024-02-23 RX ADMIN — Medication 3 UNIT(S): at 13:08

## 2024-02-23 RX ADMIN — Medication 3 UNIT(S): at 08:11

## 2024-02-23 RX ADMIN — Medication 3: at 13:09

## 2024-02-23 RX ADMIN — CHLORHEXIDINE GLUCONATE 1 APPLICATION(S): 213 SOLUTION TOPICAL at 06:25

## 2024-02-23 NOTE — PROGRESS NOTE ADULT - NUTRITIONAL ASSESSMENT
This patient has been assessed with a concern for Malnutrition and has been determined to have a diagnosis/diagnoses of Severe protein-calorie malnutrition and Underweight (BMI < 19).    This patient is being managed with:   Diet Consistent Carbohydrate w/Evening Snack-  Supplement Feeding Modality:  Oral  Ensure Max Cans or Servings Per Day:  2       Frequency:  Two Times a day  Entered: Feb 23 2024 10:53AM    Diet Consistent Carbohydrate w/Evening Snack-  Entered: Feb 22 2024  6:02AM    The following pending diet order is being considered for treatment of Severe protein-calorie malnutrition and Underweight (BMI < 19):null

## 2024-02-23 NOTE — DIETITIAN INITIAL EVALUATION ADULT - ETIOLOGY
related to inability to meet sufficient energy/protein intake secondary to decreased appetite/po intake in setting of newly dx T1DM

## 2024-02-23 NOTE — ADVANCED PRACTICE NURSE CONSULT - RECOMMEDATIONS
continue diabetes self management education  pt to inject all insulin doses while in the hospital suing prefilled insulin syringe and rn supervision  insulin pen teaching pen setting testing dosing and injection  glucoemter teaching including hand hygiene and fs  cc- pls set fu w [facility]       pls confirm [facility] eligibility

## 2024-02-23 NOTE — PROGRESS NOTE ADULT - SUBJECTIVE AND OBJECTIVE BOX
MICU Downgrade / Hospital Course:  24 y/o M w/ no PMH presented on 2/21 c/o abd pain.  Pt also reported plydypsia, polyuria and 10lb unintentional weight loss over the past few weeks.  Pt denies any recent illnesses, recent travel, illicit drug use.  Pt denies any family hx of diabetes or autoimmune dx.  While in the ED pt found to be hyperglycemic, w/ a AGMA of 25 and pH of 7.25.  He was started on an insulin gtt and admitted to MICU for new onset DM.  Gap has since closed, pt transitioned off insulin gtt to sq insulin and is tolerating diet.  Cpeptide low (0.6) and antibody w/u pending.  Endocrine following.  Pt medically stable to transfer to medicine for continued management       SUBJECTIVE / OVERNIGHT EVENTS:  No acute events reported overnight.  Pt offers no acute complaints at this time.       I&O's Summary    22 Feb 2024 07:01  -  23 Feb 2024 07:00  --------------------------------------------------------  IN: 3170 mL / OUT: 2400 mL / NET: 770 mL        PHYSICAL EXAM:  Vital Signs Last 24 Hrs  T(C): 36.6 (23 Feb 2024 09:30), Max: 36.8 (22 Feb 2024 20:29)  T(F): 97.8 (23 Feb 2024 09:30), Max: 98.2 (22 Feb 2024 20:29)  HR: 64 (23 Feb 2024 09:30) (45 - 89)  BP: 116/78 (23 Feb 2024 09:30) (106/71 - 141/73)  BP(mean): 89 (23 Feb 2024 08:00) (79 - 92)  RR: 18 (23 Feb 2024 09:30) (11 - 24)  SpO2: 100% (23 Feb 2024 09:30) (97% - 100%)    Parameters below as of 23 Feb 2024 09:30  Patient On (Oxygen Delivery Method): room air      GENERAL: pt examined bedside, laying comfortably in bed in NAD  HEENT: NC/AT, moist oral mucosa, clear conjunctiva, sclera nonicteric  RESPIRATORY: Normal respiratory effort, no wheezing, rhonchi, rales  CARDIOVASCULAR: RRR, normal S1 and S2  ABDOMEN: soft, NT/ND, normoactive bowel sounds, no rebound/guarding  EXTREMITIES: No cynaosis, no clubbing, no lower extremity edema  NEUROLOGY: A+Ox3, no focal neurologic deficits appreciated   SKIN: No rashes or no palpable lesions        LABS:                        12.6   3.65  )-----------( 191      ( 23 Feb 2024 07:00 )             35.8     02-23    138  |  102  |  4.9<L>  ----------------------------<  104<H>  3.4<L>   |  28.0  |  0.50    Ca    8.0<L>      23 Feb 2024 07:00  Phos  3.6     02-23  Mg     2.2     02-23    TPro  5.3<L>  /  Alb  3.2<L>  /  TBili  0.8  /  DBili  x   /  AST  16  /  ALT  14  /  AlkPhos  82  02-23          Urinalysis Basic - ( 23 Feb 2024 07:00 )    Color: x / Appearance: x / SG: x / pH: x  Gluc: 104 mg/dL / Ketone: x  / Bili: x / Urobili: x   Blood: x / Protein: x / Nitrite: x   Leuk Esterase: x / RBC: x / WBC x   Sq Epi: x / Non Sq Epi: x / Bacteria: x        Culture - Urine (collected 21 Feb 2024 19:30)  Source: Clean Catch Clean Catch (Midstream)  Final Report (22 Feb 2024 22:15):    <10,000 CFU/mL Normal Urogenital Lizbeth      CAPILLARY BLOOD GLUCOSE      POCT Blood Glucose.: 105 mg/dL (23 Feb 2024 07:44)  POCT Blood Glucose.: 101 mg/dL (23 Feb 2024 03:19)  POCT Blood Glucose.: 105 mg/dL (23 Feb 2024 01:06)  POCT Blood Glucose.: 145 mg/dL (22 Feb 2024 23:48)  POCT Blood Glucose.: 160 mg/dL (22 Feb 2024 22:30)  POCT Blood Glucose.: 195 mg/dL (22 Feb 2024 21:28)  POCT Blood Glucose.: 261 mg/dL (22 Feb 2024 20:21)  POCT Blood Glucose.: 257 mg/dL (22 Feb 2024 16:15)  POCT Blood Glucose.: 292 mg/dL (22 Feb 2024 15:04)  POCT Blood Glucose.: 301 mg/dL (22 Feb 2024 13:38)        RADIOLOGY & ADDITIONAL TESTS:    < from: CT Abdomen and Pelvis w/ IV Cont (02.21.24 @ 21:37) >  IMPRESSION:    No acute abdominal or pelvic pathology detected.    < end of copied text >        MEDICATIONS  (STANDING):  chlorhexidine 2% Cloths 1 Application(s) Topical <User Schedule>  dextrose 50% Injectable 25 Gram(s) IV Push once  dextrose 50% Injectable 12.5 Gram(s) IV Push once  dextrose 50% Injectable 25 Gram(s) IV Push once  dextrose 50% Injectable 25 Gram(s) IV Push once  dextrose 50% Injectable 12.5 Gram(s) IV Push once  enoxaparin Injectable 40 milliGRAM(s) SubCutaneous every 24 hours  influenza   Vaccine 0.5 milliLiter(s) IntraMuscular once  insulin glargine Injectable (LANTUS) 10 Unit(s) SubCutaneous every morning  insulin lispro (ADMELOG) corrective regimen sliding scale   SubCutaneous three times a day with meals  insulin lispro (ADMELOG) corrective regimen sliding scale   SubCutaneous at bedtime  insulin lispro Injectable (ADMELOG) 3 Unit(s) SubCutaneous three times a day before meals  potassium chloride    Tablet ER 40 milliEquivalent(s) Oral every 4 hours    MEDICATIONS  (PRN):  acetaminophen     Tablet .. 650 milliGRAM(s) Oral every 6 hours PRN Mild Pain (1 - 3)

## 2024-02-23 NOTE — DIETITIAN NUTRITION RISK NOTIFICATION - ADDITIONAL COMMENTS/DIETITIAN RECOMMENDATIONS
Add Ensure Max BID (150 kcals, 30 g pro, 1 g sugar)  Rx: MVI daily to optimize nutrition  Monitor electrolytes, replete prn  Monitor BMs / need for bowel regimen  Trend weights

## 2024-02-23 NOTE — PROGRESS NOTE ADULT - SUBJECTIVE AND OBJECTIVE BOX
INTERVAL EVENTS:  Follow up diabetes management    ROS: Denies chest pain, sob, abd pain. Endorses good appetite.     MEDICATIONS  (STANDING):  chlorhexidine 2% Cloths 1 Application(s) Topical <User Schedule>  dextrose 50% Injectable 25 Gram(s) IV Push once  dextrose 50% Injectable 25 Gram(s) IV Push once  dextrose 50% Injectable 12.5 Gram(s) IV Push once  dextrose 50% Injectable 25 Gram(s) IV Push once  dextrose 50% Injectable 12.5 Gram(s) IV Push once  enoxaparin Injectable 40 milliGRAM(s) SubCutaneous every 24 hours  influenza   Vaccine 0.5 milliLiter(s) IntraMuscular once  insulin glargine Injectable (LANTUS) 10 Unit(s) SubCutaneous every morning  insulin lispro (ADMELOG) corrective regimen sliding scale   SubCutaneous three times a day with meals  insulin lispro (ADMELOG) corrective regimen sliding scale   SubCutaneous at bedtime  insulin lispro Injectable (ADMELOG) 3 Unit(s) SubCutaneous three times a day before meals  potassium chloride    Tablet ER 40 milliEquivalent(s) Oral every 4 hours    MEDICATIONS  (PRN):  acetaminophen     Tablet .. 650 milliGRAM(s) Oral every 6 hours PRN Mild Pain (1 - 3)    Allergies  No Known Allergies    Vital Signs Last 24 Hrs  T(C): 36.6 (23 Feb 2024 09:30), Max: 36.8 (22 Feb 2024 20:29)  T(F): 97.8 (23 Feb 2024 09:30), Max: 98.2 (22 Feb 2024 20:29)  HR: 64 (23 Feb 2024 09:30) (45 - 89)  BP: 116/78 (23 Feb 2024 09:30) (106/71 - 141/73)  BP(mean): 89 (23 Feb 2024 08:00) (79 - 92)  RR: 18 (23 Feb 2024 09:30) (11 - 24)  SpO2: 100% (23 Feb 2024 09:30) (97% - 100%)    Parameters below as of 23 Feb 2024 09:30  Patient On (Oxygen Delivery Method): room air    PHYSICAL EXAM:  General: No apparent distress  Neck: Supple, trachea midline, no thyromegaly  Respiratory: Lungs clear bilaterally, normal rate, effort  Cardiac: +S1, S2, no m/r/g  GI: +BS, soft, non tender, non distended  Extremities: No peripheral edema, no pedal lesions  Neuro: A+O X3, no tremor    LABS:                        12.6   3.65  )-----------( 191      ( 23 Feb 2024 07:00 )             35.8     02-23    138  |  102  |  4.9<L>  ----------------------------<  104<H>  3.4<L>   |  28.0  |  0.50    Ca    8.0<L>      23 Feb 2024 07:00  Phos  3.6     02-23  Mg     2.2     02-23    TPro  5.3<L>  /  Alb  3.2<L>  /  TBili  0.8  /  DBili  x   /  AST  16  /  ALT  14  /  AlkPhos  82  02-23    Urinalysis Basic - ( 23 Feb 2024 07:00 )    Color: x / Appearance: x / SG: x / pH: x  Gluc: 104 mg/dL / Ketone: x  / Bili: x / Urobili: x   Blood: x / Protein: x / Nitrite: x   Leuk Esterase: x / RBC: x / WBC x   Sq Epi: x / Non Sq Epi: x / Bacteria: x    POCT Blood Glucose.: 105 mg/dL (02-23-24 @ 07:44)  POCT Blood Glucose.: 101 mg/dL (02-23-24 @ 03:19)  POCT Blood Glucose.: 105 mg/dL (02-23-24 @ 01:06)  POCT Blood Glucose.: 145 mg/dL (02-22-24 @ 23:48)  POCT Blood Glucose.: 160 mg/dL (02-22-24 @ 22:30)  POCT Blood Glucose.: 195 mg/dL (02-22-24 @ 21:28)  POCT Blood Glucose.: 261 mg/dL (02-22-24 @ 20:21)

## 2024-02-23 NOTE — CHART NOTE - NSCHARTNOTEFT_GEN_A_CORE
MICU Transfer Note  ---------------------------  Transfer from: MICU  Transfer to:  (X) Medicine    (  ) Telemetry    (  ) RCU    (  ) Palliative    (  ) Stroke Unit    (  ) _______________  [facility]    OBJECTIVE --  Vital Signs Last 24 Hrs  T(C): 36.4 (23 Feb 2024 04:01), Max: 36.8 (22 Feb 2024 20:29)  T(F): 97.5 (23 Feb 2024 04:01), Max: 98.2 (22 Feb 2024 20:29)  HR: 64 (23 Feb 2024 07:00) (45 - 89)  BP: 114/80 (23 Feb 2024 07:00) (106/71 - 141/73)  BP(mean): 90 (23 Feb 2024 07:00) (79 - 92)  RR: 14 (23 Feb 2024 07:00) (11 - 24)  SpO2: 100% (23 Feb 2024 07:00) (97% - 100%)    Parameters below as of 22 Feb 2024 16:00  Patient On (Oxygen Delivery Method): room air    I&O's Summary    22 Feb 2024 07:01  -  23 Feb 2024 07:00  --------------------------------------------------------  IN: 3170 mL / OUT: 2400 mL / NET: 770 mL    MEDICATIONS  (STANDING):  chlorhexidine 2% Cloths 1 Application(s) Topical <User Schedule>  dextrose 50% Injectable 25 Gram(s) IV Push once  dextrose 50% Injectable 25 Gram(s) IV Push once  dextrose 50% Injectable 12.5 Gram(s) IV Push once  dextrose 50% Injectable 25 Gram(s) IV Push once  dextrose 50% Injectable 12.5 Gram(s) IV Push once  enoxaparin Injectable 40 milliGRAM(s) SubCutaneous every 24 hours  influenza   Vaccine 0.5 milliLiter(s) IntraMuscular once  insulin glargine Injectable (LANTUS) 10 Unit(s) SubCutaneous every morning  insulin lispro (ADMELOG) corrective regimen sliding scale   SubCutaneous three times a day with meals  insulin lispro (ADMELOG) corrective regimen sliding scale   SubCutaneous at bedtime  insulin lispro Injectable (ADMELOG) 3 Unit(s) SubCutaneous three times a day before meals    MEDICATIONS  (PRN):  acetaminophen     Tablet .. 650 milliGRAM(s) Oral every 6 hours PRN Mild Pain (1 - 3)    LABS                                            12.6                  Neurophils% (auto):   40.0   (02-23 @ 07:00):    3.65 )-----------(191          Lymphocytes% (auto):  45.5                                          35.8                   Eosinphils% (auto):   3.0      Manual%: Neutrophils x    ; Lymphocytes x    ; Eosinophils x    ; Bands%: x    ; Blasts x                                    138    |  102    |  4.9                 Calcium: 8.0   / iCa: x      (02-23 @ 07:00)    ----------------------------<  104       Magnesium: 2.2                              3.4     |  28.0   |  0.50             Phosphorous: 3.6      TPro  5.3    /  Alb  3.2    /  TBili  0.8    /  DBili  x      /  AST  16     /  ALT  14     /  AlkPhos  82     23 Feb 2024 07:00      ASSESSMENT & PLAN:         For Follow-Up:  [ ]   [ ] MICU Transfer Note  ---------------------------  Transfer from: MICU  Transfer to:  (X) Medicine    (  ) Telemetry    (  ) RCU    (  ) Palliative    (  ) Stroke Unit    (  ) _______________  Accepting Physician: Cal MONTELONGO    MICU COURSE    OBJECTIVE --  Vital Signs Last 24 Hrs  T(C): 36.4 (23 Feb 2024 04:01), Max: 36.8 (22 Feb 2024 20:29)  T(F): 97.5 (23 Feb 2024 04:01), Max: 98.2 (22 Feb 2024 20:29)  HR: 64 (23 Feb 2024 07:00) (45 - 89)  BP: 114/80 (23 Feb 2024 07:00) (106/71 - 141/73)  BP(mean): 90 (23 Feb 2024 07:00) (79 - 92)  RR: 14 (23 Feb 2024 07:00) (11 - 24)  SpO2: 100% (23 Feb 2024 07:00) (97% - 100%)    Parameters below as of 22 Feb 2024 16:00  Patient On (Oxygen Delivery Method): room air    I&O's Summary    22 Feb 2024 07:01  -  23 Feb 2024 07:00  --------------------------------------------------------  IN: 3170 mL / OUT: 2400 mL / NET: 770 mL    MEDICATIONS  (STANDING):  chlorhexidine 2% Cloths 1 Application(s) Topical <User Schedule>  dextrose 50% Injectable 25 Gram(s) IV Push once  dextrose 50% Injectable 25 Gram(s) IV Push once  dextrose 50% Injectable 12.5 Gram(s) IV Push once  dextrose 50% Injectable 25 Gram(s) IV Push once  dextrose 50% Injectable 12.5 Gram(s) IV Push once  enoxaparin Injectable 40 milliGRAM(s) SubCutaneous every 24 hours  influenza   Vaccine 0.5 milliLiter(s) IntraMuscular once  insulin glargine Injectable (LANTUS) 10 Unit(s) SubCutaneous every morning  insulin lispro (ADMELOG) corrective regimen sliding scale   SubCutaneous three times a day with meals  insulin lispro (ADMELOG) corrective regimen sliding scale   SubCutaneous at bedtime  insulin lispro Injectable (ADMELOG) 3 Unit(s) SubCutaneous three times a day before meals    MEDICATIONS  (PRN):  acetaminophen     Tablet .. 650 milliGRAM(s) Oral every 6 hours PRN Mild Pain (1 - 3)    LABS                                            12.6                  Neurophils% (auto):   40.0   (02-23 @ 07:00):    3.65 )-----------(191          Lymphocytes% (auto):  45.5                                          35.8                   Eosinphils% (auto):   3.0      Manual%: Neutrophils x    ; Lymphocytes x    ; Eosinophils x    ; Bands%: x    ; Blasts x                                    138    |  102    |  4.9                 Calcium: 8.0   / iCa: x      (02-23 @ 07:00)    ----------------------------<  104       Magnesium: 2.2                              3.4     |  28.0   |  0.50             Phosphorous: 3.6      TPro  5.3    /  Alb  3.2    /  TBili  0.8    /  DBili  x      /  AST  16     /  ALT  14     /  AlkPhos  82     23 Feb 2024 07:00      ASSESSMENT & PLAN:   Patient is a 23 year old male with no past medical history who presented to the ED complaining of abdominal pain. He endorsed new polyuria and polydysia. constipation, and new weight loss of about 10 lbs. Symptoms have been ongoing for a few weeks now. Patient denied any recent illness, denies fever, n/v/d, or recent travel. Patient also mentioned he was not aware of any family members who have diabetes type 1 or 2. While in the ED, patient's BG was found to be 360, anion gap of 25, ph of 7.25. Patient was determined to be in DKA and MICU was consulted for further management.    A&P: 24yo male with newly diagnosed T1DM, admitted to ICU for DKA, hypokalemic on most recent labs.     -s/p insulin infusion w/ resolution: euglycemic, anion gap closed   -transitioned to Lantus/sliding scale   -2x 40mEq PO K+ and 3x 10mEq IV K+ administered, potassium levels on f/u BMP pending  -endocrinology consult initiated, f/u with recommendations  -patient stable for downgrade        For Follow-Up:  [ ]   [ ] MICU Transfer Note  ---------------------------  Transfer from: MICU  Transfer to:  (X) Medicine    (  ) Telemetry    (  ) RCU    (  ) Palliative    (  ) Stroke Unit    (  ) _______________  Accepting Physician: CHIARA Mccall MD    [facility]    OBJECTIVE --  Vital Signs Last 24 Hrs  T(C): 36.4 (23 Feb 2024 04:01), Max: 36.8 (22 Feb 2024 20:29)  T(F): 97.5 (23 Feb 2024 04:01), Max: 98.2 (22 Feb 2024 20:29)  HR: 64 (23 Feb 2024 07:00) (45 - 89)  BP: 114/80 (23 Feb 2024 07:00) (106/71 - 141/73)  BP(mean): 90 (23 Feb 2024 07:00) (79 - 92)  RR: 14 (23 Feb 2024 07:00) (11 - 24)  SpO2: 100% (23 Feb 2024 07:00) (97% - 100%)    Parameters below as of 22 Feb 2024 16:00  Patient On (Oxygen Delivery Method): room air    I&O's Summary    22 Feb 2024 07:01  -  23 Feb 2024 07:00  --------------------------------------------------------  IN: 3170 mL / OUT: 2400 mL / NET: 770 mL    MEDICATIONS  (STANDING):  chlorhexidine 2% Cloths 1 Application(s) Topical <User Schedule>  dextrose 50% Injectable 25 Gram(s) IV Push once  dextrose 50% Injectable 25 Gram(s) IV Push once  dextrose 50% Injectable 12.5 Gram(s) IV Push once  dextrose 50% Injectable 25 Gram(s) IV Push once  dextrose 50% Injectable 12.5 Gram(s) IV Push once  enoxaparin Injectable 40 milliGRAM(s) SubCutaneous every 24 hours  influenza   Vaccine 0.5 milliLiter(s) IntraMuscular once  insulin glargine Injectable (LANTUS) 10 Unit(s) SubCutaneous every morning  insulin lispro (ADMELOG) corrective regimen sliding scale   SubCutaneous three times a day with meals  insulin lispro (ADMELOG) corrective regimen sliding scale   SubCutaneous at bedtime  insulin lispro Injectable (ADMELOG) 3 Unit(s) SubCutaneous three times a day before meals    MEDICATIONS  (PRN):  acetaminophen     Tablet .. 650 milliGRAM(s) Oral every 6 hours PRN Mild Pain (1 - 3)    LABS                                            12.6                  Neurophils% (auto):   40.0   (02-23 @ 07:00):    3.65 )-----------(191          Lymphocytes% (auto):  45.5                                          35.8                   Eosinphils% (auto):   3.0      Manual%: Neutrophils x    ; Lymphocytes x    ; Eosinophils x    ; Bands%: x    ; Blasts x                                    138    |  102    |  4.9                 Calcium: 8.0   / iCa: x      (02-23 @ 07:00)    ----------------------------<  104       Magnesium: 2.2                              3.4     |  28.0   |  0.50             Phosphorous: 3.6      TPro  5.3    /  Alb  3.2    /  TBili  0.8    /  DBili  x      /  AST  16     /  ALT  14     /  AlkPhos  82     23 Feb 2024 07:00      ASSESSMENT & PLAN:   Patient is a 23 year old male with no past medical history who presented to the ED complaining of abdominal pain. He endorsed new polyuria and polydysia. constipation, and new weight loss of about 10 lbs. Symptoms have been ongoing for a few weeks now. Patient denied any recent illness, denies fever, n/v/d, or recent travel. Patient also mentioned he was not aware of any family members who have diabetes type 1 or 2. While in the ED, patient's BG was found to be 360, anion gap of 25, ph of 7.25. Patient was determined to be in DKA and MICU was consulted for further management.    A&P: 22yo male with newly diagnosed T1DM, admitted to ICU for DKA, hypokalemic on most recent labs.     -s/p insulin infusion w/ resolution: euglycemic, anion gap closed   -transitioned to Lantus/sliding scale   -2x 40mEq PO K+ and 3x 10mEq IV K+ administered, potassium levels on f/u BMP pending  -endocrinology consult initiated, f/u with recommendations  -patient stable for downgrade        For Follow-Up:  [ ]   [ ] MICU Transfer Note  ---------------------------  Transfer from: MICU  Transfer to:  (X) Medicine    (  ) Telemetry    (  ) RCU    (  ) Palliative    (  ) Stroke Unit    (  ) _______________  Accepting Physician: CHIARA Mccall MD    [facility]    OBJECTIVE --  Vital Signs Last 24 Hrs  T(C): 36.4 (23 Feb 2024 04:01), Max: 36.8 (22 Feb 2024 20:29)  T(F): 97.5 (23 Feb 2024 04:01), Max: 98.2 (22 Feb 2024 20:29)  HR: 64 (23 Feb 2024 07:00) (45 - 89)  BP: 114/80 (23 Feb 2024 07:00) (106/71 - 141/73)  BP(mean): 90 (23 Feb 2024 07:00) (79 - 92)  RR: 14 (23 Feb 2024 07:00) (11 - 24)  SpO2: 100% (23 Feb 2024 07:00) (97% - 100%)    Parameters below as of 22 Feb 2024 16:00  Patient On (Oxygen Delivery Method): room air    I&O's Summary    22 Feb 2024 07:01  -  23 Feb 2024 07:00  --------------------------------------------------------  IN: 3170 mL / OUT: 2400 mL / NET: 770 mL    MEDICATIONS  (STANDING):  chlorhexidine 2% Cloths 1 Application(s) Topical <User Schedule>  dextrose 50% Injectable 25 Gram(s) IV Push once  dextrose 50% Injectable 25 Gram(s) IV Push once  dextrose 50% Injectable 12.5 Gram(s) IV Push once  dextrose 50% Injectable 25 Gram(s) IV Push once  dextrose 50% Injectable 12.5 Gram(s) IV Push once  enoxaparin Injectable 40 milliGRAM(s) SubCutaneous every 24 hours  influenza   Vaccine 0.5 milliLiter(s) IntraMuscular once  insulin glargine Injectable (LANTUS) 10 Unit(s) SubCutaneous every morning  insulin lispro (ADMELOG) corrective regimen sliding scale   SubCutaneous three times a day with meals  insulin lispro (ADMELOG) corrective regimen sliding scale   SubCutaneous at bedtime  insulin lispro Injectable (ADMELOG) 3 Unit(s) SubCutaneous three times a day before meals    MEDICATIONS  (PRN):  acetaminophen     Tablet .. 650 milliGRAM(s) Oral every 6 hours PRN Mild Pain (1 - 3)    LABS                                            12.6                  Neurophils% (auto):   40.0   (02-23 @ 07:00):    3.65 )-----------(191          Lymphocytes% (auto):  45.5                                          35.8                   Eosinphils% (auto):   3.0      Manual%: Neutrophils x    ; Lymphocytes x    ; Eosinophils x    ; Bands%: x    ; Blasts x                                    138    |  102    |  4.9                 Calcium: 8.0   / iCa: x      (02-23 @ 07:00)    ----------------------------<  104       Magnesium: 2.2                              3.4     |  28.0   |  0.50             Phosphorous: 3.6      TPro  5.3    /  Alb  3.2    /  TBili  0.8    /  DBili  x      /  AST  16     /  ALT  14     /  AlkPhos  82     23 Feb 2024 07:00      ASSESSMENT & PLAN:   Patient is a 23 year old male with no past medical history who presented to the ED complaining of abdominal pain. He endorsed new polyuria and polydysia. Also endorsed constipation and new weight loss of about 10 lbs for a few weeks now. In the ED, patient's BG was found to be 360, anion gap of 25, ph of 7.25. Patient was determined to be in DKA and MICU was consulted. Pt received lantus and was transitioned off insulin drip. Pt currently euglycemic and anion gap closed      For Follow-Up:  [ ] Potassium levels  [ ] Endocrinology recommendations  [ ] Diabetes and insulin education MICU Transfer Note  ---------------------------  Transfer from: MICU  Transfer to:  (X) Medicine    (  ) Telemetry    (  ) RCU    (  ) Palliative    (  ) Stroke Unit    (  ) _______________  Accepting Physician: CHIARA Mccall MD    MICU COURSE    Patient is a 23 year old male with no past medical history who presented to the ED complaining of abdominal pain. He endorsed new polyuria and polydysia. Also endorsed constipation and new weight loss of about 10 lbs for a few weeks now. In the ED, patient's BG was found to be 360, anion gap of 25, ph of 7.25. Patient was determined to be in DKA and MICU was consulted. Pt received lantus and was transitioned off insulin drip. Pt currently euglycemic and anion gap closed.    OBJECTIVE --  Vital Signs Last 24 Hrs  T(C): 36.4 (23 Feb 2024 04:01), Max: 36.8 (22 Feb 2024 20:29)  T(F): 97.5 (23 Feb 2024 04:01), Max: 98.2 (22 Feb 2024 20:29)  HR: 64 (23 Feb 2024 07:00) (45 - 89)  BP: 114/80 (23 Feb 2024 07:00) (106/71 - 141/73)  BP(mean): 90 (23 Feb 2024 07:00) (79 - 92)  RR: 14 (23 Feb 2024 07:00) (11 - 24)  SpO2: 100% (23 Feb 2024 07:00) (97% - 100%)    Parameters below as of 22 Feb 2024 16:00  Patient On (Oxygen Delivery Method): room air    I&O's Summary    22 Feb 2024 07:01  -  23 Feb 2024 07:00  --------------------------------------------------------  IN: 3170 mL / OUT: 2400 mL / NET: 770 mL    MEDICATIONS  (STANDING):  chlorhexidine 2% Cloths 1 Application(s) Topical <User Schedule>  dextrose 50% Injectable 25 Gram(s) IV Push once  dextrose 50% Injectable 25 Gram(s) IV Push once  dextrose 50% Injectable 12.5 Gram(s) IV Push once  dextrose 50% Injectable 25 Gram(s) IV Push once  dextrose 50% Injectable 12.5 Gram(s) IV Push once  enoxaparin Injectable 40 milliGRAM(s) SubCutaneous every 24 hours  influenza   Vaccine 0.5 milliLiter(s) IntraMuscular once  insulin glargine Injectable (LANTUS) 10 Unit(s) SubCutaneous every morning  insulin lispro (ADMELOG) corrective regimen sliding scale   SubCutaneous three times a day with meals  insulin lispro (ADMELOG) corrective regimen sliding scale   SubCutaneous at bedtime  insulin lispro Injectable (ADMELOG) 3 Unit(s) SubCutaneous three times a day before meals    MEDICATIONS  (PRN):  acetaminophen     Tablet .. 650 milliGRAM(s) Oral every 6 hours PRN Mild Pain (1 - 3)    LABS                                            12.6                  Neurophils% (auto):   40.0   (02-23 @ 07:00):    3.65 )-----------(191          Lymphocytes% (auto):  45.5                                          35.8                   Eosinphils% (auto):   3.0      Manual%: Neutrophils x    ; Lymphocytes x    ; Eosinophils x    ; Bands%: x    ; Blasts x                                    138    |  102    |  4.9                 Calcium: 8.0   / iCa: x      (02-23 @ 07:00)    ----------------------------<  104       Magnesium: 2.2                              3.4     |  28.0   |  0.50             Phosphorous: 3.6      TPro  5.3    /  Alb  3.2    /  TBili  0.8    /  DBili  x      /  AST  16     /  ALT  14     /  AlkPhos  82     23 Feb 2024 07:00      ASSESSMENT & PLAN:   22 yo male with newly diagnosed T1DM, admitted to ICU for DKA, transitioned off insulin drip, currently in sliding scale insulin.     For Follow-Up:  [ ] Potassium levels  [ ] Endocrinology recommendations  [ ] Diabetes and insulin education

## 2024-02-23 NOTE — DIETITIAN INITIAL EVALUATION ADULT - PERTINENT MEDS FT
MEDICATIONS  (STANDING):  dextrose 50% Injectable 25 Gram(s) IV Push once  insulin glargine Injectable (LANTUS) 10 Unit(s) SubCutaneous every morning  insulin lispro (ADMELOG) corrective regimen sliding scale   SubCutaneous three times a day with meals  potassium chloride    Tablet ER 40 milliEquivalent(s) Oral every 4 hours

## 2024-02-23 NOTE — PROGRESS NOTE ADULT - SUBJECTIVE AND OBJECTIVE BOX
INTERVAL HPI/OVERNIGHT EVENTS:    Patient received lantus and transitioned off the insulin drip.    SUBJECTIVE: Patient seen and examined at bedside.     ROS: All negative except as listed above.    VITAL SIGNS:  ICU Vital Signs Last 24 Hrs  T(C): 36.4 (23 Feb 2024 04:01), Max: 36.8 (22 Feb 2024 20:29)  T(F): 97.5 (23 Feb 2024 04:01), Max: 98.2 (22 Feb 2024 20:29)  HR: 64 (23 Feb 2024 07:00) (45 - 89)  BP: 114/80 (23 Feb 2024 07:00) (106/71 - 141/73)  BP(mean): 90 (23 Feb 2024 07:00) (79 - 92)  RR: 14 (23 Feb 2024 07:00) (11 - 24)  SpO2: 100% (23 Feb 2024 07:00) (97% - 100%)    O2 Parameters below as of 22 Feb 2024 16:00  Patient On (Oxygen Delivery Method): room air    Plateau pressure:   P/F ratio:     02-22 @ 07:01  -  02-23 @ 07:00  --------------------------------------------------------  IN: 3170 mL / OUT: 2400 mL / NET: 770 mL      CAPILLARY BLOOD GLUCOSE      POCT Blood Glucose.: 101 mg/dL (23 Feb 2024 03:19)      ECG: reviewed.    PHYSICAL EXAM:    GENERAL: NAD, lying in bed comfortably  HEAD:  Atraumatic, normocephalic  EYES: EOMI, PERRLA, conjunctiva and sclera clear  NECK: Supple, trachea midline, no JVD  HEART: Regular rate and rhythm, no murmurs, rubs, or gallops  LUNGS: Unlabored respirations.  Clear to auscultation bilaterally, no crackles, wheezing, or rhonchi  ABDOMEN: Soft, nontender, nondistended, +BS  EXTREMITIES: 2+ peripheral pulses bilaterally, cap refill<2 secs. No clubbing, cyanosis, or edema  NERVOUS SYSTEM:  A&Ox3, following commands, moving all extremities, no focal deficits   SKIN: No rashes or lesions    MEDICATIONS:  MEDICATIONS  (STANDING):  chlorhexidine 2% Cloths 1 Application(s) Topical <User Schedule>  dextrose 50% Injectable 25 Gram(s) IV Push once  dextrose 50% Injectable 25 Gram(s) IV Push once  dextrose 50% Injectable 12.5 Gram(s) IV Push once  dextrose 50% Injectable 25 Gram(s) IV Push once  dextrose 50% Injectable 12.5 Gram(s) IV Push once  enoxaparin Injectable 40 milliGRAM(s) SubCutaneous every 24 hours  influenza   Vaccine 0.5 milliLiter(s) IntraMuscular once  insulin glargine Injectable (LANTUS) 10 Unit(s) SubCutaneous every morning  insulin lispro (ADMELOG) corrective regimen sliding scale   SubCutaneous three times a day with meals  insulin lispro (ADMELOG) corrective regimen sliding scale   SubCutaneous at bedtime  insulin lispro Injectable (ADMELOG) 3 Unit(s) SubCutaneous three times a day before meals    MEDICATIONS  (PRN):  acetaminophen     Tablet .. 650 milliGRAM(s) Oral every 6 hours PRN Mild Pain (1 - 3)      ALLERGIES:  Allergies    No Known Allergies    Intolerances        LABS:                        12.6   3.65  )-----------( 191      ( 23 Feb 2024 07:00 )             35.8     02-23    138  |  102  |  4.9<L>  ----------------------------<  104<H>  3.4<L>   |  28.0  |  0.50    Ca    8.0<L>      23 Feb 2024 07:00  Phos  3.6     02-23  Mg     2.2     02-23    TPro  5.3<L>  /  Alb  3.2<L>  /  TBili  0.8  /  DBili  x   /  AST  16  /  ALT  14  /  AlkPhos  82  02-23      Urinalysis Basic - ( 23 Feb 2024 07:00 )    Color: x / Appearance: x / SG: x / pH: x  Gluc: 104 mg/dL / Ketone: x  / Bili: x / Urobili: x   Blood: x / Protein: x / Nitrite: x   Leuk Esterase: x / RBC: x / WBC x   Sq Epi: x / Non Sq Epi: x / Bacteria: x      ABG:      vBG:    Micro:        RADIOLOGY & ADDITIONAL TESTS: Reviewed.

## 2024-02-23 NOTE — PROGRESS NOTE ADULT - ASSESSMENT
23M with no past medical history. Presented with abdominal pain, endorses urinary frequency, polydipsia, and weight loss. In ED, glucose was 360, anion gap of 25, pH of 7.2. Admitted for DKA.    1. New onset T1DM s/p DKA, a1c 11.8% - glucoses improving  - Lantus 10 units daily  - Premeal admelog 3 units TID and sliding scale coverage  - C-peptide 0.6/ MILTON, insulin ab, ICA pending  - Has no insurance --> will need [facility], follow up with [facility]  - Needs diabetes, insulin, and glucometer teaching    2. Hypokalemia  - Supplement as per primary team

## 2024-02-23 NOTE — DIETITIAN INITIAL EVALUATION ADULT - OTHER INFO
24 yo male with no pertinent past medical hx admitted to ICU for DKA, newly diagnosed T1DM. Now downgraded to medicine. Endo following.

## 2024-02-23 NOTE — PROGRESS NOTE ADULT - ASSESSMENT
A&P: 22yo male with newly diagnosed T1DM, admitted to ICU for DKA, hypokalemic on most recent labs.     -s/p insulin infusion w/ resolution: euglycemic, anion gap closed   -transitioned to Lantus/sliding scale   -2x 40mEq PO K+ and 3x 10mEq IV K+ administered, potassium levels on f/u BMP pending  -endocrinology consult initiated, f/u with recommendations  -patient stable for downgrade

## 2024-02-23 NOTE — ADVANCED PRACTICE NURSE CONSULT - ASSESSMENT
went to see pt in am pt co polyures and polydepsia for the last 2 weeks. pt was educated about diabetes disease process and the importance of using insulin. pt verbalized understanding. he was educated about the 2 types of insulin he will be gettiing their different schedule and action written material about the 2 type of insulins provided in Malay as per pt 's request. he was educated abut the importance of bg monitoring he was advised to check bg 3-4xdaily parameters provided. also educated about ss of hypoglycemia and treatment written material about hypoglycemic provided. he was educated about healthy eating habits the plate method was used to teach portions. written material about the plate method provided written material about the use of insulin pen provided. pt was able to demonstrate insulin injection using insulin syringe w some prompting and get over his fear of needles. he was encouraged to ambulate as tolerated in order to improve glycemic control. he was invited to the type1 club on february 25 2024 yearly calendar provided

## 2024-02-23 NOTE — DIETITIAN INITIAL EVALUATION ADULT - ORAL INTAKE PTA/DIET HISTORY
MST consult triggered for 2-13 lb weight loss and poor po intake prior to admission. Met with pt at bedside this morning, downgraded to 5TWR from ICU. On admission pt c/o abdominal pain, weight loss of 10 lbs x a few weeks. Appears thin, moderate muscle/fat wasting. Pt states his appetite is improving, ate 100% of breakfast this morning (strawberries, 1 keto pancake, 1 slice of leo). Tolerating, denies any N/V/C/D. RN ordering pt's lunch at time of visit. He typically follows a regular diet, has NKFA. Provided consistent CHO diet education to pt, left handouts at bedside. Made aware RD remains available for any nutrition related questions/concerns. Reinforcement at follow up as feasible.

## 2024-02-23 NOTE — DIETITIAN INITIAL EVALUATION ADULT - PERTINENT LABORATORY DATA
02-23    138  |  102  |  4.9<L>  ----------------------------<  104<H>  3.4<L>   |  28.0  |  0.50    Ca    8.0<L>      23 Feb 2024 07:00  Phos  3.6     02-23  Mg     2.2     02-23    TPro  5.3<L>  /  Alb  3.2<L>  /  TBili  0.8  /  DBili  x   /  AST  16  /  ALT  14  /  AlkPhos  82  02-23  POCT Blood Glucose.: 105 mg/dL (02-23-24 @ 07:44)  A1C with Estimated Average Glucose Result: 11.8 % (02-22-24 @ 12:32)

## 2024-02-23 NOTE — PROGRESS NOTE ADULT - ASSESSMENT
22 y/o M w/ no PMH presented on 2/21 c/o abd pain.  Pt also reported plydypsia, polyuria and 10lb unintentional weight loss over the past few weeks.  Pt denies any recent illnesses, recent travel, illicit drug use.  Pt denies any family hx of diabetes or autoimmune dx.  While in the ED pt found to be hyperglycemic, w/ a AGMA of 25 and pH of 7.25.  He was started on an insulin gtt and admitted to MICU for new onset DM.  Gap has since closed, pt transitioned off insulin gtt to sq insulin and is tolerating diet.  Cpeptide low (0.6) and antibody w/u pending.  Endocrine following.  Pt medically stable to transfer to medicine for continued management.      DKA, resolved   New DM Type I  - A1c 11.8  - Cpeptide low   - MILTON, anti-insulin and islet cell antibodies pending   - Hyperglycemia improved   - c/w basal/bolus insulin regimen and titrate to maintain BG<180  - ISS and hypoglycemic protocol in place   - Endocrinology following   - c/w DM education       Normocytic anemia and leukopenia   - Likely dilutional from IVFs given all 3 cell lines have down trended   - No signs of infection, nontoxic appearing and afebrile    - Monitor off antibiotics  - Trend CBC and temperature        Hypokalemia  - Supplemented in MICU  - Replete as needed to maintain K>4        VTE ppx: ambulatory  22 y/o M w/ no PMH presented on 2/21 c/o abd pain.  Pt also reported plydypsia, polyuria and 10lb unintentional weight loss over the past few weeks.  Pt denies any recent illnesses, recent travel, illicit drug use.  Pt denies any family hx of diabetes or autoimmune dx.  While in the ED pt found to be hyperglycemic, w/ a AGMA of 25 and pH of 7.25.  He was started on an insulin gtt and admitted to MICU for new onset DM.  Gap has since closed, pt transitioned off insulin gtt to sq insulin and is tolerating diet.  Cpeptide low (0.6) and antibody w/u pending.  Endocrine following.  Pt medically stable to transfer to medicine for continued management.      DKA, resolved   New DM Type I  - A1c 11.8  - Cpeptide low   - MILTON, anti-insulin and islet cell antibodies pending   - Hyperglycemia improved   - c/w basal/bolus insulin regimen and titrate to maintain BG<180  - ISS and hypoglycemic protocol in place   - Endocrinology following   - c/w DM education       Normocytic anemia and leukopenia   - Likely dilutional from IVFs given all 3 cell lines have down trended   - No signs of infection, nontoxic appearing and afebrile    - Monitor off antibiotics  - Trend CBC and temperature        Hypokalemia  - Supplemented in MICU  - Replete as needed to maintain K>4        VTE ppx:  lovenox

## 2024-02-24 ENCOUNTER — TRANSCRIPTION ENCOUNTER (OUTPATIENT)
Age: 24
End: 2024-02-24

## 2024-02-24 VITALS
SYSTOLIC BLOOD PRESSURE: 122 MMHG | HEART RATE: 67 BPM | OXYGEN SATURATION: 98 % | TEMPERATURE: 98 F | RESPIRATION RATE: 18 BRPM | DIASTOLIC BLOOD PRESSURE: 67 MMHG

## 2024-02-24 LAB
ALBUMIN SERPL ELPH-MCNC: 3.5 G/DL — SIGNIFICANT CHANGE UP (ref 3.3–5.2)
ALP SERPL-CCNC: 84 U/L — SIGNIFICANT CHANGE UP (ref 40–120)
ALT FLD-CCNC: 15 U/L — SIGNIFICANT CHANGE UP
ANION GAP SERPL CALC-SCNC: 13 MMOL/L — SIGNIFICANT CHANGE UP (ref 5–17)
AST SERPL-CCNC: 16 U/L — SIGNIFICANT CHANGE UP
BILIRUB SERPL-MCNC: 0.6 MG/DL — SIGNIFICANT CHANGE UP (ref 0.4–2)
BUN SERPL-MCNC: 7.5 MG/DL — LOW (ref 8–20)
CALCIUM SERPL-MCNC: 8.5 MG/DL — SIGNIFICANT CHANGE UP (ref 8.4–10.5)
CHLORIDE SERPL-SCNC: 101 MMOL/L — SIGNIFICANT CHANGE UP (ref 96–108)
CO2 SERPL-SCNC: 25 MMOL/L — SIGNIFICANT CHANGE UP (ref 22–29)
CREAT SERPL-MCNC: 0.56 MG/DL — SIGNIFICANT CHANGE UP (ref 0.5–1.3)
EGFR: 142 ML/MIN/1.73M2 — SIGNIFICANT CHANGE UP
GLUCOSE BLDC GLUCOMTR-MCNC: 231 MG/DL — HIGH (ref 70–99)
GLUCOSE SERPL-MCNC: 248 MG/DL — HIGH (ref 70–99)
HCT VFR BLD CALC: 37.7 % — LOW (ref 39–50)
HGB BLD-MCNC: 13.1 G/DL — SIGNIFICANT CHANGE UP (ref 13–17)
MAGNESIUM SERPL-MCNC: 1.9 MG/DL — SIGNIFICANT CHANGE UP (ref 1.6–2.6)
MCHC RBC-ENTMCNC: 29.3 PG — SIGNIFICANT CHANGE UP (ref 27–34)
MCHC RBC-ENTMCNC: 34.7 GM/DL — SIGNIFICANT CHANGE UP (ref 32–36)
MCV RBC AUTO: 84.3 FL — SIGNIFICANT CHANGE UP (ref 80–100)
PHOSPHATE SERPL-MCNC: 4.4 MG/DL — SIGNIFICANT CHANGE UP (ref 2.4–4.7)
PLATELET # BLD AUTO: 204 K/UL — SIGNIFICANT CHANGE UP (ref 150–400)
POTASSIUM SERPL-MCNC: 3.9 MMOL/L — SIGNIFICANT CHANGE UP (ref 3.5–5.3)
POTASSIUM SERPL-SCNC: 3.9 MMOL/L — SIGNIFICANT CHANGE UP (ref 3.5–5.3)
PROT SERPL-MCNC: 5.7 G/DL — LOW (ref 6.6–8.7)
RBC # BLD: 4.47 M/UL — SIGNIFICANT CHANGE UP (ref 4.2–5.8)
RBC # FLD: 12.3 % — SIGNIFICANT CHANGE UP (ref 10.3–14.5)
SODIUM SERPL-SCNC: 139 MMOL/L — SIGNIFICANT CHANGE UP (ref 135–145)
WBC # BLD: 4.72 K/UL — SIGNIFICANT CHANGE UP (ref 3.8–10.5)
WBC # FLD AUTO: 4.72 K/UL — SIGNIFICANT CHANGE UP (ref 3.8–10.5)

## 2024-02-24 PROCEDURE — 84100 ASSAY OF PHOSPHORUS: CPT

## 2024-02-24 PROCEDURE — 84132 ASSAY OF SERUM POTASSIUM: CPT

## 2024-02-24 PROCEDURE — 84295 ASSAY OF SERUM SODIUM: CPT

## 2024-02-24 PROCEDURE — 85014 HEMATOCRIT: CPT

## 2024-02-24 PROCEDURE — 83605 ASSAY OF LACTIC ACID: CPT

## 2024-02-24 PROCEDURE — 83735 ASSAY OF MAGNESIUM: CPT

## 2024-02-24 PROCEDURE — 90686 IIV4 VACC NO PRSV 0.5 ML IM: CPT

## 2024-02-24 PROCEDURE — 82330 ASSAY OF CALCIUM: CPT

## 2024-02-24 PROCEDURE — 99239 HOSP IP/OBS DSCHRG MGMT >30: CPT

## 2024-02-24 PROCEDURE — 74177 CT ABD & PELVIS W/CONTRAST: CPT | Mod: MA

## 2024-02-24 PROCEDURE — 99285 EMERGENCY DEPT VISIT HI MDM: CPT | Mod: 25

## 2024-02-24 PROCEDURE — 86337 INSULIN ANTIBODIES: CPT

## 2024-02-24 PROCEDURE — 85018 HEMOGLOBIN: CPT

## 2024-02-24 PROCEDURE — 86341 ISLET CELL ANTIBODY: CPT

## 2024-02-24 PROCEDURE — 87641 MR-STAPH DNA AMP PROBE: CPT

## 2024-02-24 PROCEDURE — 84681 ASSAY OF C-PEPTIDE: CPT

## 2024-02-24 PROCEDURE — 82947 ASSAY GLUCOSE BLOOD QUANT: CPT

## 2024-02-24 PROCEDURE — 82962 GLUCOSE BLOOD TEST: CPT

## 2024-02-24 PROCEDURE — 80048 BASIC METABOLIC PNL TOTAL CA: CPT

## 2024-02-24 PROCEDURE — 81001 URINALYSIS AUTO W/SCOPE: CPT

## 2024-02-24 PROCEDURE — 36415 COLL VENOUS BLD VENIPUNCTURE: CPT

## 2024-02-24 PROCEDURE — 82803 BLOOD GASES ANY COMBINATION: CPT

## 2024-02-24 PROCEDURE — 85027 COMPLETE CBC AUTOMATED: CPT

## 2024-02-24 PROCEDURE — 0225U NFCT DS DNA&RNA 21 SARSCOV2: CPT

## 2024-02-24 PROCEDURE — 87640 STAPH A DNA AMP PROBE: CPT

## 2024-02-24 PROCEDURE — 80053 COMPREHEN METABOLIC PANEL: CPT

## 2024-02-24 PROCEDURE — 82435 ASSAY OF BLOOD CHLORIDE: CPT

## 2024-02-24 PROCEDURE — 87086 URINE CULTURE/COLONY COUNT: CPT

## 2024-02-24 PROCEDURE — 96374 THER/PROPH/DIAG INJ IV PUSH: CPT

## 2024-02-24 PROCEDURE — 85025 COMPLETE CBC W/AUTO DIFF WBC: CPT

## 2024-02-24 PROCEDURE — 83036 HEMOGLOBIN GLYCOSYLATED A1C: CPT

## 2024-02-24 RX ORDER — GLUCAGON INJECTION, SOLUTION 0.5 MG/.1ML
0 INJECTION, SOLUTION SUBCUTANEOUS
Qty: 1 | Refills: 0
Start: 2024-02-24

## 2024-02-24 RX ORDER — INSULIN GLARGINE 100 [IU]/ML
10 INJECTION, SOLUTION SUBCUTANEOUS
Qty: 2 | Refills: 0
Start: 2024-02-24 | End: 2024-03-24

## 2024-02-24 RX ORDER — INSULIN LISPRO 100/ML
1 VIAL (ML) SUBCUTANEOUS
Qty: 1 | Refills: 0
Start: 2024-02-24 | End: 2024-03-24

## 2024-02-24 RX ORDER — ISOPROPYL ALCOHOL, BENZOCAINE .7; .06 ML/ML; ML/ML
0 SWAB TOPICAL
Qty: 100 | Refills: 1
Start: 2024-02-24

## 2024-02-24 RX ORDER — GLUCAGON INJECTION, SOLUTION 0.5 MG/.1ML
1 INJECTION, SOLUTION SUBCUTANEOUS
Qty: 1 | Refills: 0
Start: 2024-02-24

## 2024-02-24 RX ORDER — INSULIN LISPRO 100/ML
3 VIAL (ML) SUBCUTANEOUS
Qty: 3 | Refills: 0
Start: 2024-02-24 | End: 2024-03-24

## 2024-02-24 RX ADMIN — INSULIN GLARGINE 10 UNIT(S): 100 INJECTION, SOLUTION SUBCUTANEOUS at 09:11

## 2024-02-24 RX ADMIN — INFLUENZA VIRUS VACCINE 0.5 MILLILITER(S): 15; 15; 15; 15 SUSPENSION INTRAMUSCULAR at 15:09

## 2024-02-24 RX ADMIN — Medication 3 UNIT(S): at 09:11

## 2024-02-24 RX ADMIN — CHLORHEXIDINE GLUCONATE 1 APPLICATION(S): 213 SOLUTION TOPICAL at 05:44

## 2024-02-24 RX ADMIN — Medication 3: at 09:12

## 2024-02-24 NOTE — DISCHARGE NOTE PROVIDER - PROVIDER TOKENS
FREE:[LAST:[[facility]],PHONE:[(   )    -],FAX:[(   )    -],SCHEDULEDAPPT:[02/28/2024]],PROVIDER:[TOKEN:[[id number]],FOLLOWUP:[2 weeks]]

## 2024-02-24 NOTE — DISCHARGE NOTE NURSING/CASE MANAGEMENT/SOCIAL WORK - NSDCFUADDAPPT_GEN_ALL_CORE_FT
[facility]  Your appointment is scheduled for: 2/28/24 at 9:15 AM  [address] [place] [address]   If you need to change or cancel this appointment   please call the office directly at: [phone]

## 2024-02-24 NOTE — DISCHARGE NOTE PROVIDER - ATTENDING DISCHARGE PHYSICAL EXAMINATION:
T(C): 36.7 (02-24-24 @ 10:23), Max: 37.2 (02-23-24 @ 16:37)  HR: 67 (02-24-24 @ 10:23) (16 - 76)  BP: 112/66 (02-24-24 @ 10:23) (108/67 - 125/70)  RR: 18 (02-24-24 @ 10:23) (16 - 18)  SpO2: 99% (02-24-24 @ 10:23) (96% - 99%)    CONSTITUTIONAL: Well groomed, no apparent distress  EYES: No conjunctival or scleral injection, non-icteric  ENMT: Oral mucosa with moist membranes.   NECK: Supple, symmetric and without tracheal deviation   RESP: No respiratory distress, no use of accessory muscles; CTA b/l, no WRR  CV: RRR, +S1S2, no MRG; no JVD; no peripheral edema  GI: Soft, NT, ND, no rebound, no guarding  MSK: Normal gait  SKIN: No rashes or ulcers noted  NEURO: CN II-XII intact; normal reflexes in upper and lower extremities, sensation intact in upper and lower extremities b/l to light touch   PSYCH: Appropriate insight/judgment; A+O x 3, mood and affect appropriate, recent/remote memory intact

## 2024-02-24 NOTE — DISCHARGE NOTE PROVIDER - HOSPITAL COURSE
24 y/o M w/ no PMH presented on 2/21 c/o abd pain.  Pt also reported plydypsia, polyuria and 10lb unintentional weight loss over the past few weeks.  Pt denies any recent illnesses, recent travel, illicit drug use.  Pt denies any family hx of diabetes or autoimmune dx.  While in the ED pt found to be hyperglycemic, w/ a AGMA of 25 and pH of 7.25.  He was started on an insulin gtt and admitted to MICU for new onset DM.  Gap has since closed, pt transitioned off insulin gtt to sq insulin and is tolerating diet.  CT abdomen pelvis unremarkable. C-peptide low (0.6) and antibody w/u pending.  Endocrine following.  Evaluated by endocrine, started on lantus 10 units at bedtime and 3 units pre-meal with meals along with ISS. PT improved clinically and optimized for dc.

## 2024-02-24 NOTE — DISCHARGE NOTE PROVIDER - NSDCMRMEDTOKEN_GEN_ALL_CORE_FT
Admelog 100 units/mL injectable solution: 3 unit(s) injectable 3 times a day 3 unit(s) subcutaneous 3 times a day (with meals)  Admelog SoloStar 100 units/mL injectable solution: 1 unit(s) injectable 3 times a day (before meals) Insulin sliding scale as follows        1 Unit(s) if Glucose 151 - 200      3 Unit(s) if Glucose 201 - 250      5 Unit(s) if Glucose 251 - 300      7 Unit(s) if Glucose 301 - 350      9 Unit(s) if Glucose 351 - 400  SubCutaneous, three times a day with meals  Administration Instructions: Dispose unused medication in BLACK bin.  This is a High Alert Medication.  alcohol swabs: Apply topically to affected area 4 times a day  Glucagon Emergency Kit for Low Blood Sugar 1 mg injection: 1 milligram(s) injectable once as needed for Severe Hypoglycemia 1 milligram(s) intramuscular once as needed for severe hypoglycemia, then call 911.  glucometer (per patient&#x27;s insurance): Test blood sugars four times a day. Dispense #1 glucometer.  glucose tablets: Follow instructions on bottle when sugar is low.  Insulin Pen Needles, 4mm: 1 application subcutaneously 4 times a day. ** Use with insulin pen **  lancets: 1 application subcutaneously 4 times a day  Lantus Solostar Pen 100 units/mL subcutaneous solution: 10 unit(s) subcutaneous once a day (at bedtime) 10 unit(s) subcutaneous once at bedtime  test strips (per patient&#x27;s insurance): 1 application subcutaneously 4 times a day. ** Compatible with patient&#x27;s glucometer **

## 2024-02-24 NOTE — DISCHARGE NOTE NURSING/CASE MANAGEMENT/SOCIAL WORK - PATIENT PORTAL LINK FT
You can access the FollowMyHealth Patient Portal offered by [facility] by registering at the following website: [email or web address]. By joining [employer]’s FollowMyHealth portal, you will also be able to view your health information using other applications (apps) compatible with our system.

## 2024-02-24 NOTE — DISCHARGE NOTE PROVIDER - CARE PROVIDER_API CALL
[facility],   Phone: (   )    -  Fax: (   )    -  Scheduled Appointment: 02/28/2024    Saba Neff  Endocrinology/Metab/Diabetes  [address] [place] [id number]  Phone: [phone]  Fax: [phone]  Follow Up Time: 2 weeks

## 2024-02-24 NOTE — DISCHARGE NOTE PROVIDER - NSDCCPCAREPLAN_GEN_ALL_CORE_FT
PRINCIPAL DISCHARGE DIAGNOSIS  Diagnosis: DKA (diabetic ketoacidosis)  Assessment and Plan of Treatment: Newly diagnosed type 1 DM   To check blood sugar at home more frequently when sick  To administer insulin regularly without skipping doses      SECONDARY DISCHARGE DIAGNOSES  Diagnosis: Newly diagnosed type 1 diabetes mellitus  Assessment and Plan of Treatment: To f/u at [facility] on 2/28/24  To take Insulin Lantus 10mg once a day at bedtime  To take Insulin Lispro 3mg three times a day before meals  To take Insulin sliding scale as prescribed  Diabetic education provided  To follow up with endocrinology   To maintain good glycemic control  To regularly check blood sugar at home

## 2024-02-24 NOTE — DISCHARGE NOTE PROVIDER - NSDCFUADDAPPT_GEN_ALL_CORE_FT
[facility]  Your appointment is scheduled for: 2/28/24 at 9:15 AM  [address] [place] [address]   If you need to change or cancel this appointment   please call the office directly at: [phone]

## 2024-02-27 LAB — ISLET CELL512 AB SER-ACNC: ABNORMAL

## 2024-03-02 LAB — INSULIN ANTIBODIES: 7.4 UU/ML — HIGH

## 2024-03-04 LAB — GAD65 AB SER-MCNC: 403 NMOL/L — HIGH

## 2024-04-08 ENCOUNTER — APPOINTMENT (OUTPATIENT)
Dept: ENDOCRINOLOGY | Facility: CLINIC | Age: 24
End: 2024-04-08

## 2025-08-29 ENCOUNTER — OFFICE (OUTPATIENT)
Dept: URBAN - METROPOLITAN AREA CLINIC 94 | Facility: CLINIC | Age: 25
Setting detail: OPHTHALMOLOGY
End: 2025-08-29
Payer: MEDICAID

## 2025-08-29 DIAGNOSIS — H16.223: ICD-10-CM

## 2025-08-29 DIAGNOSIS — H52.13: ICD-10-CM

## 2025-08-29 DIAGNOSIS — E10.9: ICD-10-CM

## 2025-08-29 PROCEDURE — 92015 DETERMINE REFRACTIVE STATE: CPT | Performed by: OPHTHALMOLOGY

## 2025-08-29 PROCEDURE — 92004 COMPRE OPH EXAM NEW PT 1/>: CPT | Performed by: OPHTHALMOLOGY

## 2025-08-29 ASSESSMENT — KERATOMETRY
OS_K1POWER_DIOPTERS: 45.75
OD_K1POWER_DIOPTERS: 46.00
OS_AXISANGLE_DEGREES: 101
OD_AXISANGLE_DEGREES: 090
OD_K2POWER_DIOPTERS: 46.00
OS_K2POWER_DIOPTERS: 46.25

## 2025-08-29 ASSESSMENT — REFRACTION_MANIFEST
OD_AXIS: 089
OU_VA: 20/20
OD_VA1: 20/20
OS_AXIS: 104
OD_SPHERE: -1.50
OS_CYLINDER: -0.25
OD_AXIS: 089
OD_VA1: 20/20
OS_VA1: 20/20
OS_SPHERE: -1.00
OS_CYLINDER: -0.25
OS_SPHERE: -1.00
OU_VA: 20/20
OS_AXIS: 104
OD_SPHERE: -1.25
OD_CYLINDER: -0.50
OD_CYLINDER: -0.75
OS_VA1: 20/20

## 2025-08-29 ASSESSMENT — CONFRONTATIONAL VISUAL FIELD TEST (CVF)
OS_FINDINGS: FULL
OD_FINDINGS: FULL

## 2025-08-29 ASSESSMENT — REFRACTION_CURRENTRX
OD_VPRISM_DIRECTION: SV
OD_CYLINDER: 0.00
OS_SPHERE: -1.00
OS_CYLINDER: 0.00
OS_OVR_VA: 20/
OD_SPHERE: -1.00
OS_VPRISM_DIRECTION: SV
OD_OVR_VA: 20/
OS_AXIS: 000
OD_AXIS: 000

## 2025-08-29 ASSESSMENT — REFRACTION_AUTOREFRACTION
OD_SPHERE: -1.00
OS_SPHERE: -1.25
OS_AXIS: 104
OD_AXIS: 089
OD_CYLINDER: -0.75
OS_CYLINDER: -0.25

## 2025-08-29 ASSESSMENT — TONOMETRY
OD_IOP_MMHG: 16
OS_IOP_MMHG: 16

## 2025-08-29 ASSESSMENT — VISUAL ACUITY
OD_BCVA: 20/20
OS_BCVA: 20/20

## 2025-08-29 ASSESSMENT — SUPERFICIAL PUNCTATE KERATITIS (SPK)
OS_SPK: T
OD_SPK: T